# Patient Record
Sex: FEMALE | Race: WHITE | Employment: OTHER | URBAN - METROPOLITAN AREA
[De-identification: names, ages, dates, MRNs, and addresses within clinical notes are randomized per-mention and may not be internally consistent; named-entity substitution may affect disease eponyms.]

---

## 2017-01-05 ENCOUNTER — HOSPITAL ENCOUNTER (OUTPATIENT)
Dept: MAMMOGRAPHY | Age: 52
Discharge: HOME | End: 2017-01-05
Attending: SURGERY | Admitting: SURGERY

## 2017-01-05 DIAGNOSIS — Z12.31 VISIT FOR SCREENING MAMMOGRAM: ICD-10-CM

## 2017-02-28 ENCOUNTER — OFFICE VISIT (OUTPATIENT)
Dept: FAMILY MEDICINE CLINIC | Age: 52
End: 2017-02-28

## 2017-02-28 VITALS
OXYGEN SATURATION: 95 % | WEIGHT: 199 LBS | SYSTOLIC BLOOD PRESSURE: 121 MMHG | HEART RATE: 95 BPM | TEMPERATURE: 96.7 F | BODY MASS INDEX: 34.16 KG/M2 | DIASTOLIC BLOOD PRESSURE: 72 MMHG

## 2017-02-28 DIAGNOSIS — F41.9 ANXIETY: ICD-10-CM

## 2017-02-28 DIAGNOSIS — D47.3 ESSENTIAL THROMBOCYTOSIS (HCC): Primary | ICD-10-CM

## 2017-02-28 DIAGNOSIS — M25.50 ARTHRALGIA OF MULTIPLE JOINTS: ICD-10-CM

## 2017-02-28 DIAGNOSIS — E66.9 NON MORBID OBESITY, UNSPECIFIED OBESITY TYPE: ICD-10-CM

## 2017-02-28 DIAGNOSIS — L88 PYODERMA GANGRENOSA: ICD-10-CM

## 2017-02-28 PROCEDURE — 99214 OFFICE O/P EST MOD 30 MIN: CPT | Performed by: FAMILY MEDICINE

## 2017-02-28 RX ORDER — MELOXICAM 15 MG/1
15 TABLET ORAL DAILY
Qty: 30 TABLET | Refills: 5 | Status: SHIPPED | OUTPATIENT
Start: 2017-02-28 | End: 2017-08-24 | Stop reason: SDUPTHER

## 2017-02-28 RX ORDER — ALPRAZOLAM 1 MG/1
TABLET ORAL
Qty: 30 TABLET | Refills: 2 | Status: SHIPPED | OUTPATIENT
Start: 2017-02-28 | End: 2017-05-30 | Stop reason: SDUPTHER

## 2017-05-17 RX ORDER — HYDROXYUREA 500 MG/1
CAPSULE ORAL
Qty: 30 CAPSULE | Refills: 5 | Status: SHIPPED | OUTPATIENT
Start: 2017-05-17 | End: 2017-10-25 | Stop reason: SDUPTHER

## 2017-05-30 ENCOUNTER — OFFICE VISIT (OUTPATIENT)
Dept: FAMILY MEDICINE CLINIC | Age: 52
End: 2017-05-30

## 2017-05-30 VITALS
BODY MASS INDEX: 34.42 KG/M2 | SYSTOLIC BLOOD PRESSURE: 123 MMHG | WEIGHT: 200.5 LBS | DIASTOLIC BLOOD PRESSURE: 54 MMHG | RESPIRATION RATE: 14 BRPM | TEMPERATURE: 97 F | HEART RATE: 82 BPM

## 2017-05-30 DIAGNOSIS — K21.9 GASTROESOPHAGEAL REFLUX DISEASE WITHOUT ESOPHAGITIS: ICD-10-CM

## 2017-05-30 DIAGNOSIS — E66.9 OBESITY (BMI 30-39.9): ICD-10-CM

## 2017-05-30 DIAGNOSIS — R63.5 WEIGHT GAIN: Primary | ICD-10-CM

## 2017-05-30 DIAGNOSIS — F41.9 ANXIETY: ICD-10-CM

## 2017-05-30 DIAGNOSIS — D45 POLYCYTHEMIA VERA (HCC): ICD-10-CM

## 2017-05-30 PROCEDURE — 99214 OFFICE O/P EST MOD 30 MIN: CPT | Performed by: FAMILY MEDICINE

## 2017-05-30 RX ORDER — ALPRAZOLAM 1 MG/1
TABLET ORAL
Qty: 30 TABLET | Refills: 2 | Status: SHIPPED | OUTPATIENT
Start: 2017-05-30 | End: 2017-08-14 | Stop reason: SDUPTHER

## 2017-05-30 RX ORDER — OMEPRAZOLE 20 MG/1
CAPSULE, DELAYED RELEASE ORAL
Qty: 30 CAPSULE | Refills: 5 | Status: SHIPPED | OUTPATIENT
Start: 2017-05-30 | End: 2017-10-25 | Stop reason: SDUPTHER

## 2017-08-14 ENCOUNTER — OFFICE VISIT (OUTPATIENT)
Dept: FAMILY MEDICINE CLINIC | Age: 52
End: 2017-08-14

## 2017-08-14 VITALS
RESPIRATION RATE: 12 BRPM | WEIGHT: 177.4 LBS | BODY MASS INDEX: 30.45 KG/M2 | DIASTOLIC BLOOD PRESSURE: 63 MMHG | HEART RATE: 83 BPM | TEMPERATURE: 96.5 F | OXYGEN SATURATION: 95 % | SYSTOLIC BLOOD PRESSURE: 116 MMHG

## 2017-08-14 DIAGNOSIS — F41.9 ANXIETY: ICD-10-CM

## 2017-08-14 DIAGNOSIS — D45 POLYCYTHEMIA VERA (HCC): Primary | ICD-10-CM

## 2017-08-14 DIAGNOSIS — R06.83 SNORING: ICD-10-CM

## 2017-08-14 DIAGNOSIS — E66.9 NON MORBID OBESITY, UNSPECIFIED OBESITY TYPE: ICD-10-CM

## 2017-08-14 PROCEDURE — 99214 OFFICE O/P EST MOD 30 MIN: CPT | Performed by: FAMILY MEDICINE

## 2017-08-14 RX ORDER — ALPRAZOLAM 1 MG/1
TABLET ORAL
Qty: 30 TABLET | Refills: 2 | Status: SHIPPED | OUTPATIENT
Start: 2017-08-14 | End: 2017-10-25 | Stop reason: SDUPTHER

## 2017-10-25 ENCOUNTER — HOSPITAL ENCOUNTER (OUTPATIENT)
Dept: OTHER | Age: 52
Discharge: HOME | End: 2017-10-25
Attending: FAMILY MEDICINE | Admitting: FAMILY MEDICINE

## 2017-10-25 ENCOUNTER — OFFICE VISIT (OUTPATIENT)
Dept: FAMILY MEDICINE CLINIC | Age: 52
End: 2017-10-25

## 2017-10-25 VITALS
HEART RATE: 79 BPM | DIASTOLIC BLOOD PRESSURE: 63 MMHG | RESPIRATION RATE: 16 BRPM | WEIGHT: 169 LBS | OXYGEN SATURATION: 98 % | BODY MASS INDEX: 29.01 KG/M2 | SYSTOLIC BLOOD PRESSURE: 130 MMHG

## 2017-10-25 DIAGNOSIS — D47.3 ESSENTIAL THROMBOCYTOSIS (HCC): ICD-10-CM

## 2017-10-25 DIAGNOSIS — D45 POLYCYTHEMIA VERA (HCC): ICD-10-CM

## 2017-10-25 DIAGNOSIS — Z00.00 ROUTINE GENERAL MEDICAL EXAMINATION AT A HEALTH CARE FACILITY: ICD-10-CM

## 2017-10-25 DIAGNOSIS — Z23 NEEDS FLU SHOT: ICD-10-CM

## 2017-10-25 DIAGNOSIS — D47.3 ESSENTIAL THROMBOCYTOSIS (HCC): Primary | ICD-10-CM

## 2017-10-25 DIAGNOSIS — M79.642 BILATERAL HAND PAIN: ICD-10-CM

## 2017-10-25 DIAGNOSIS — M79.641 BILATERAL HAND PAIN: ICD-10-CM

## 2017-10-25 DIAGNOSIS — F41.9 ANXIETY: ICD-10-CM

## 2017-10-25 DIAGNOSIS — M25.50 ARTHRALGIA OF MULTIPLE JOINTS: ICD-10-CM

## 2017-10-25 DIAGNOSIS — K21.9 GASTROESOPHAGEAL REFLUX DISEASE WITHOUT ESOPHAGITIS: ICD-10-CM

## 2017-10-25 LAB
A/G RATIO: 1.8 (ref 1.1–2.2)
ALBUMIN SERPL-MCNC: 4.6 G/DL (ref 3.4–5)
ALP BLD-CCNC: 68 U/L (ref 40–129)
ALT SERPL-CCNC: 52 U/L (ref 10–40)
ANION GAP SERPL CALCULATED.3IONS-SCNC: 16 MMOL/L (ref 3–16)
AST SERPL-CCNC: 32 U/L (ref 15–37)
BILIRUB SERPL-MCNC: 0.5 MG/DL (ref 0–1)
BUN BLDV-MCNC: 16 MG/DL (ref 7–20)
C-REACTIVE PROTEIN: 3.5 MG/L (ref 0–5.1)
CALCIUM SERPL-MCNC: 10 MG/DL (ref 8.3–10.6)
CHLORIDE BLD-SCNC: 100 MMOL/L (ref 99–110)
CHOLESTEROL, TOTAL: 247 MG/DL (ref 0–199)
CO2: 26 MMOL/L (ref 21–32)
CREAT SERPL-MCNC: 0.6 MG/DL (ref 0.6–1.1)
GFR AFRICAN AMERICAN: >60
GFR NON-AFRICAN AMERICAN: >60
GLOBULIN: 2.5 G/DL
GLUCOSE BLD-MCNC: 97 MG/DL (ref 70–99)
HCT VFR BLD CALC: 46.2 % (ref 36–48)
HDLC SERPL-MCNC: 74 MG/DL (ref 40–60)
HEMOGLOBIN: 15.4 G/DL (ref 12–16)
LDL CHOLESTEROL CALCULATED: 147 MG/DL
MCH RBC QN AUTO: 36 PG (ref 26–34)
MCHC RBC AUTO-ENTMCNC: 33.2 G/DL (ref 31–36)
MCV RBC AUTO: 108.4 FL (ref 80–100)
PDW BLD-RTO: 14 % (ref 12.4–15.4)
PLATELET # BLD: 376 K/UL (ref 135–450)
PMV BLD AUTO: 8.3 FL (ref 5–10.5)
POTASSIUM SERPL-SCNC: 5.7 MMOL/L (ref 3.5–5.1)
RBC # BLD: 4.26 M/UL (ref 4–5.2)
RHEUMATOID FACTOR: <10 IU/ML
SEDIMENTATION RATE, ERYTHROCYTE: 16 MM/HR (ref 0–30)
SODIUM BLD-SCNC: 142 MMOL/L (ref 136–145)
TOTAL PROTEIN: 7.1 G/DL (ref 6.4–8.2)
TRIGL SERPL-MCNC: 129 MG/DL (ref 0–150)
TSH SERPL DL<=0.05 MIU/L-ACNC: 1.26 UIU/ML (ref 0.27–4.2)
VLDLC SERPL CALC-MCNC: 26 MG/DL
WBC # BLD: 5.2 K/UL (ref 4–11)

## 2017-10-25 PROCEDURE — 99214 OFFICE O/P EST MOD 30 MIN: CPT | Performed by: FAMILY MEDICINE

## 2017-10-25 PROCEDURE — 3014F SCREEN MAMMO DOC REV: CPT | Performed by: FAMILY MEDICINE

## 2017-10-25 PROCEDURE — 3017F COLORECTAL CA SCREEN DOC REV: CPT | Performed by: FAMILY MEDICINE

## 2017-10-25 PROCEDURE — G8484 FLU IMMUNIZE NO ADMIN: HCPCS | Performed by: FAMILY MEDICINE

## 2017-10-25 PROCEDURE — 1036F TOBACCO NON-USER: CPT | Performed by: FAMILY MEDICINE

## 2017-10-25 PROCEDURE — 90686 IIV4 VACC NO PRSV 0.5 ML IM: CPT | Performed by: FAMILY MEDICINE

## 2017-10-25 PROCEDURE — G8427 DOCREV CUR MEDS BY ELIG CLIN: HCPCS | Performed by: FAMILY MEDICINE

## 2017-10-25 PROCEDURE — 90471 IMMUNIZATION ADMIN: CPT | Performed by: FAMILY MEDICINE

## 2017-10-25 PROCEDURE — G8417 CALC BMI ABV UP PARAM F/U: HCPCS | Performed by: FAMILY MEDICINE

## 2017-10-25 RX ORDER — ALPRAZOLAM 1 MG/1
TABLET ORAL
Qty: 30 TABLET | Refills: 2 | Status: SHIPPED | OUTPATIENT
Start: 2017-10-25 | End: 2018-01-23 | Stop reason: SDUPTHER

## 2017-10-25 RX ORDER — HYDROXYUREA 500 MG/1
CAPSULE ORAL
Qty: 30 CAPSULE | Refills: 5 | Status: SHIPPED | OUTPATIENT
Start: 2017-10-25 | End: 2018-05-07 | Stop reason: SDUPTHER

## 2017-10-25 RX ORDER — OMEPRAZOLE 20 MG/1
CAPSULE, DELAYED RELEASE ORAL
Qty: 30 CAPSULE | Refills: 5 | Status: SHIPPED | OUTPATIENT
Start: 2017-10-25 | End: 2018-05-07 | Stop reason: SDUPTHER

## 2017-10-25 RX ORDER — CELECOXIB 200 MG/1
200 CAPSULE ORAL DAILY
Qty: 30 CAPSULE | Refills: 5 | Status: SHIPPED | OUTPATIENT
Start: 2017-10-25 | End: 2018-04-18 | Stop reason: SDUPTHER

## 2017-10-25 NOTE — PROGRESS NOTES
Vaccine Information Sheet, \"Influenza - Inactivated\"  given to Lyndsay Rahman, or parent/legal guardian of  Lyndsay Rahman and verbalized understanding. Patient responses:    Have you ever had a reaction to a flu vaccine? Yes- Migraine years ago  Are you able to eat eggs without adverse effects? Yes  Do you have any current illness? No  Have you ever had Guillian Cordova Syndrome? No    Flu vaccine given per order. Please see immunization tab. Current Influenza vaccine VIS given to patient. Influenza consent form/questionnaire completed and signed. Patient responses to  Influenza consent form / questionnaire  reviewed. Vaccine given per protocol.

## 2017-10-25 NOTE — PROGRESS NOTES
Here for eval of some pain in hands. Pt started with sx acutely a few days ago, does not remember any trigger for sx. Sx started in middle finger L > R but now seems to be the entire hand. Middle is worse. Sx are in fingers and thumbs. Pt has not had similar sx to this degree. Pt states that she tried to treat with NSAIDs and tylenol with some mild improvement but not completely improved. Pt does have hx of osteoarthritis and does take mobic. Pts grandmother did have Rheumatoid Arthritis. Pt has had a history of osteoarthritis to hands, but not to this degress. Pt has not been on steroids recently but states that when she was, had mood changes. Pt is concerned about severity of sx. Pt did have xray of finger in the past but not of hands. Pt does not remember any change in diet, no change in activity level that would account for her symptoms. No significant sx in knees, ankles, hips, feet, etc.     Pt states that otherwise has been doing well, denies any new concerns or issues. States mood doing well, stable. deneis any breakthru issues of anxiety, no panic sx. No issues of chest pain, shortness of breath. No fever, chills      Except as noted above in the history of present illness, the review of systems is  negative for headache, vision changes, chest pain, shortness of breath, abdominal pain, urinary sx, bowel changes. Past medical, surgical, and social history reviewed and updated  Medications and allergies reviewed and updated       O: /63   Pulse 79   Resp 16   Wt 169 lb (76.7 kg)   LMP 10/21/2017   SpO2 98%   Breastfeeding? No   BMI 29.01 kg/m²   GEN: No acute distress, cooperative, well nourished, alert. HEENT: PEERLA, EOMI , normocephalic/atraumatic, nares and oropharynx clear. Mucus membranes normal, Tympanic membranes clear bilaterally. Neck: soft, supple, no thyromegaly, mass, no Lymphadenopathy  CV: Regular rate and rhythm, no murmur, rubs, gallops.  No edema.  Resp: Clear to auscultation bilaterally good air entry bilaterally  No crackles, wheeze. Breathing comfortably. Musc: full range of motion bilateral upper extremities. Mild pain to hands bilaterally over PIP and DIP joints R > L without visible synovitis or swelling. Psych: mood stable, No suicidal thoughts or ideation      Current Outpatient Prescriptions   Medication Sig Dispense Refill    ALPRAZolam (XANAX) 1 MG tablet TAKE 1 TABLET BY MOUTH THREE TIMES DAILY AS NEEDED FOR ANXIETY 30 tablet 2    omeprazole (PRILOSEC) 20 MG delayed release capsule TAKE 1 CAPSULE BY MOUTH DAILY 30 capsule 5    hydroxyurea (HYDREA) 500 MG chemo capsule TAKE ONE CAPSULE BY MOUTH DAILY OR AS DIRECTED BASED ON BLOOD WORK 30 capsule 5    celecoxib (CELEBREX) 200 MG capsule Take 1 capsule by mouth daily 30 capsule 5    PROAIR  (90 BASE) MCG/ACT inhaler INHALE TWO PUFFS BY MOUTH EVERY FOUR HOURS AS NEEDED FOR WHEEZING OR SHORTNESS OF BREATH 8.5 g 5    Ascorbic Acid (VITAMIN C) 1000 MG tablet Take 1,000 mg by mouth daily.  vitamin B-12 (CYANOCOBALAMIN) 100 MCG tablet Take 100 mcg by mouth daily.  Pyridoxine HCl (VITAMIN B-6) 50 MG tablet Take 100 mg by mouth daily.  ferrous sulfate 325 (65 FE) MG tablet Take 325 mg by mouth daily (with breakfast). No current facility-administered medications for this visit. ASSESSMENT / PLAN:    1. Anxiety  Stable w/o flare of sx  Cont prn xanax  refills given as below  - ALPRAZolam (XANAX) 1 MG tablet; TAKE 1 TABLET BY MOUTH THREE TIMES DAILY AS NEEDED FOR ANXIETY  Dispense: 30 tablet; Refill: 2  Controlled Substances Monitoring:     Attestation: The Prescription Monitoring Report for this patient was reviewed today. Asif Ramirez MD)  Documentation: Possible medication side effects, risk of tolerance and/or dependence, and alternative treatments discussed., No signs of potential drug abuse or diversion identified. Asif Ramirez MD)     2. Gastroesophageal reflux disease without esophagitis  Stable with PPI therapy  Check cmp  - omeprazole (PRILOSEC) 20 MG delayed release capsule; TAKE 1 CAPSULE BY MOUTH DAILY  Dispense: 30 capsule; Refill: 5    3. Essential thrombocytosis (HCC)  Stable with hydrea  Cont to monitor closely, f/u with heme/onc  Check fasting bloodwork as below:  - CBC; Future  - Comprehensive Metabolic Panel; Future    4. Polycythemia vera ([facility] [address]  - CBC; Future    5. Needs flu shot  Given today    6. Routine general medical examination at a health care facility  Check fasting bloodwork for:  - Lipid Panel; Future  - TSH without Reflex; Future    7. Arthralgia of multiple joints  Exam nonfocal but with moderate irritation/discomfort  Check bloodwork to r/o underlying RA or connective tissue disease  Change meds from mobic to celebrex 200mg qd  Discussed use, benefit, and side effects of prescribed medications. Barriers to medication compliance addressed. All patient questions answered. Pt voiced understanding. Check xrays hands bilaterally  - NAVI; Future  - Rheumatoid Factor; Future  - Sedimentation Rate; Future  - C-Reactive Protein; Future    8. Bilateral hand pain  As above, check xrays and bloodwork  Management pending results. Follow-up appointment:   Pending bloodwork results/xray results/3 mos/prn    Discussed use, benefit, and side effects of all prescribed medications. Barriers to medication compliance addressed. All patient questions answered. Pt voiced understanding. When applicable, patient's outside records were reviewed through [facility]. The patient has signed appropriate paperworks/consents. Dragon dictation software was used for parts of this progress note. All attempts were made to correct any errors and ensure accuracy.

## 2017-10-26 LAB
ANA INTERPRETATION: ABNORMAL
ANA PATTERN: ABNORMAL
ANA TITER: ABNORMAL
ANTI-NUCLEAR ANTIBODY (ANA): POSITIVE
PATHOLOGIST: ABNORMAL

## 2018-01-11 ENCOUNTER — HOSPITAL ENCOUNTER (OUTPATIENT)
Dept: MAMMOGRAPHY | Age: 53
Discharge: HOME | End: 2018-01-11
Attending: SURGERY | Admitting: SURGERY

## 2018-01-11 DIAGNOSIS — Z12.31 VISIT FOR SCREENING MAMMOGRAM: ICD-10-CM

## 2018-01-23 ENCOUNTER — OFFICE VISIT (OUTPATIENT)
Dept: FAMILY MEDICINE CLINIC | Age: 53
End: 2018-01-23

## 2018-01-23 VITALS
DIASTOLIC BLOOD PRESSURE: 75 MMHG | WEIGHT: 172 LBS | SYSTOLIC BLOOD PRESSURE: 132 MMHG | BODY MASS INDEX: 29.37 KG/M2 | RESPIRATION RATE: 14 BRPM | TEMPERATURE: 97.9 F | HEIGHT: 64 IN | OXYGEN SATURATION: 97 % | HEART RATE: 71 BPM

## 2018-01-23 DIAGNOSIS — R00.2 PALPITATIONS: ICD-10-CM

## 2018-01-23 DIAGNOSIS — D49.2 NEOPLASM OF SKIN OF FOREHEAD: ICD-10-CM

## 2018-01-23 DIAGNOSIS — D75.839 THROMBOCYTOSIS: Primary | ICD-10-CM

## 2018-01-23 DIAGNOSIS — F41.9 ANXIETY: ICD-10-CM

## 2018-01-23 PROCEDURE — 1036F TOBACCO NON-USER: CPT | Performed by: FAMILY MEDICINE

## 2018-01-23 PROCEDURE — G8417 CALC BMI ABV UP PARAM F/U: HCPCS | Performed by: FAMILY MEDICINE

## 2018-01-23 PROCEDURE — 3017F COLORECTAL CA SCREEN DOC REV: CPT | Performed by: FAMILY MEDICINE

## 2018-01-23 PROCEDURE — G8484 FLU IMMUNIZE NO ADMIN: HCPCS | Performed by: FAMILY MEDICINE

## 2018-01-23 PROCEDURE — 17000 DESTRUCT PREMALG LESION: CPT | Performed by: FAMILY MEDICINE

## 2018-01-23 PROCEDURE — 3014F SCREEN MAMMO DOC REV: CPT | Performed by: FAMILY MEDICINE

## 2018-01-23 PROCEDURE — G8427 DOCREV CUR MEDS BY ELIG CLIN: HCPCS | Performed by: FAMILY MEDICINE

## 2018-01-23 PROCEDURE — 99214 OFFICE O/P EST MOD 30 MIN: CPT | Performed by: FAMILY MEDICINE

## 2018-01-23 RX ORDER — ALPRAZOLAM 1 MG/1
TABLET ORAL
Qty: 30 TABLET | Refills: 2 | Status: SHIPPED | OUTPATIENT
Start: 2018-01-23 | End: 2018-04-09 | Stop reason: SDUPTHER

## 2018-01-23 NOTE — PROGRESS NOTES
Here for f/u, pt states that overall has been doing well, here for f/u of meds. Pt states that she is feeling well. Pt is working on improvement in exercise, did struggle a bit over the holidays. Pt has cut down on EtOH and is increasing exercise. Pt doing well with exercising daily, does feel well with exercise. Pt does have plans to do the 5K flying pig in a few months. Pt feels diet is doing well, although did struggle a bit over the holidays. Weight is stable. Pt denies any new of chest pain, shortness of breath. Pt will get some very intermittent flutter. Pt gets sx a few times a week, and only when mary grace to bed. Pt does not feel a flutter but some abnromal beat. Never with sx during exercise, only when laying down. No dizziness, lightheadedness. Can get some mild pressure in head. When she coughs or drinks water, sx go away. No syncope. Pt wit lesion on L forehead, present for a few months to 1 year. Does have some bleeding. Not healing well and wants to have evaulated. Pt is not treating at this time. Except as noted above in the history of present illness, the review of systems is  negative for headache, vision changes, chest pain, shortness of breath, abdominal pain, urinary sx, bowel changes. Past medical, surgical, and social history reviewed and updated  Medications and allergies reviewed and updated         O: /75   Pulse 71   Temp 97.9 °F (36.6 °C) (Oral)   Resp 14   Ht 5' 4\" (1.626 m)   Wt 172 lb (78 kg)   LMP 01/22/2018 (Exact Date)   SpO2 97%   Breastfeeding? No   BMI 29.52 kg/m²   GEN: No acute distress, cooperative, well nourished, alert. HEENT: PEERLA, EOMI , normocephalic/atraumatic, nares and oropharynx clear. Mucus membranes normal, Tympanic membranes clear bilaterally. Neck: soft, supple, no thyromegaly, mass, no Lymphadenopathy  CV: Regular rate and rhythm, no murmur, rubs, gallops. No edema.   Resp: Clear to auscultation bilaterally

## 2018-01-29 ENCOUNTER — OFFICE VISIT (OUTPATIENT)
Dept: FAMILY MEDICINE CLINIC | Age: 53
End: 2018-01-29

## 2018-01-29 VITALS
RESPIRATION RATE: 16 BRPM | HEART RATE: 78 BPM | SYSTOLIC BLOOD PRESSURE: 134 MMHG | OXYGEN SATURATION: 98 % | DIASTOLIC BLOOD PRESSURE: 73 MMHG

## 2018-01-29 DIAGNOSIS — J45.21 MILD INTERMITTENT REACTIVE AIRWAY DISEASE WITH ACUTE EXACERBATION: ICD-10-CM

## 2018-01-29 DIAGNOSIS — R05.9 COUGH: ICD-10-CM

## 2018-01-29 DIAGNOSIS — L98.9 LESION OF SKIN OF FACE: ICD-10-CM

## 2018-01-29 DIAGNOSIS — R09.89 CHEST CONGESTION: ICD-10-CM

## 2018-01-29 DIAGNOSIS — J06.9 ACUTE URI: Primary | ICD-10-CM

## 2018-01-29 PROBLEM — J45.909 REACTIVE AIRWAY DISEASE: Status: ACTIVE | Noted: 2018-01-29

## 2018-01-29 PROCEDURE — 3017F COLORECTAL CA SCREEN DOC REV: CPT | Performed by: FAMILY MEDICINE

## 2018-01-29 PROCEDURE — G8427 DOCREV CUR MEDS BY ELIG CLIN: HCPCS | Performed by: FAMILY MEDICINE

## 2018-01-29 PROCEDURE — 99214 OFFICE O/P EST MOD 30 MIN: CPT | Performed by: FAMILY MEDICINE

## 2018-01-29 PROCEDURE — 3014F SCREEN MAMMO DOC REV: CPT | Performed by: FAMILY MEDICINE

## 2018-01-29 PROCEDURE — G8417 CALC BMI ABV UP PARAM F/U: HCPCS | Performed by: FAMILY MEDICINE

## 2018-01-29 PROCEDURE — 1036F TOBACCO NON-USER: CPT | Performed by: FAMILY MEDICINE

## 2018-01-29 PROCEDURE — G8484 FLU IMMUNIZE NO ADMIN: HCPCS | Performed by: FAMILY MEDICINE

## 2018-01-29 RX ORDER — PROMETHAZINE HYDROCHLORIDE AND CODEINE PHOSPHATE 6.25; 1 MG/5ML; MG/5ML
5 SYRUP ORAL 4 TIMES DAILY PRN
Qty: 150 ML | Refills: 0 | Status: SHIPPED | OUTPATIENT
Start: 2018-01-29 | End: 2018-02-05

## 2018-01-29 RX ORDER — AZITHROMYCIN 250 MG/1
TABLET, FILM COATED ORAL
Qty: 1 PACKET | Refills: 0 | Status: SHIPPED | OUTPATIENT
Start: 2018-01-29 | End: 2018-02-08

## 2018-03-26 ENCOUNTER — OFFICE VISIT (OUTPATIENT)
Dept: PSYCHOLOGY | Age: 53
End: 2018-03-26

## 2018-03-26 DIAGNOSIS — F43.23 ADJUSTMENT DISORDER WITH MIXED ANXIETY AND DEPRESSED MOOD: Primary | ICD-10-CM

## 2018-03-26 PROCEDURE — 90791 PSYCH DIAGNOSTIC EVALUATION: CPT | Performed by: PSYCHOLOGIST

## 2018-03-26 ASSESSMENT — PATIENT HEALTH QUESTIONNAIRE - PHQ9
7. TROUBLE CONCENTRATING ON THINGS, SUCH AS READING THE NEWSPAPER OR WATCHING TELEVISION: 2
3. TROUBLE FALLING OR STAYING ASLEEP: 3
6. FEELING BAD ABOUT YOURSELF - OR THAT YOU ARE A FAILURE OR HAVE LET YOURSELF OR YOUR FAMILY DOWN: 1
9. THOUGHTS THAT YOU WOULD BE BETTER OFF DEAD, OR OF HURTING YOURSELF: 0
5. POOR APPETITE OR OVEREATING: 2
SUM OF ALL RESPONSES TO PHQ QUESTIONS 1-9: 13
1. LITTLE INTEREST OR PLEASURE IN DOING THINGS: 0
8. MOVING OR SPEAKING SO SLOWLY THAT OTHER PEOPLE COULD HAVE NOTICED. OR THE OPPOSITE, BEING SO FIGETY OR RESTLESS THAT YOU HAVE BEEN MOVING AROUND A LOT MORE THAN USUAL: 2
2. FEELING DOWN, DEPRESSED OR HOPELESS: 1
SUM OF ALL RESPONSES TO PHQ9 QUESTIONS 1 & 2: 1
4. FEELING TIRED OR HAVING LITTLE ENERGY: 2

## 2018-03-26 ASSESSMENT — ANXIETY QUESTIONNAIRES
GAD7 TOTAL SCORE: 17
3. WORRYING TOO MUCH ABOUT DIFFERENT THINGS: 3-NEARLY EVERY DAY
4. TROUBLE RELAXING: 3-NEARLY EVERY DAY
5. BEING SO RESTLESS THAT IT IS HARD TO SIT STILL: 3-NEARLY EVERY DAY
2. NOT BEING ABLE TO STOP OR CONTROL WORRYING: 3-NEARLY EVERY DAY
7. FEELING AFRAID AS IF SOMETHING AWFUL MIGHT HAPPEN: 1-SEVERAL DAYS
6. BECOMING EASILY ANNOYED OR IRRITABLE: 1-SEVERAL DAYS
1. FEELING NERVOUS, ANXIOUS, OR ON EDGE: 3-NEARLY EVERY DAY

## 2018-03-26 NOTE — PROGRESS NOTES
place, time, general circumstance  Memory: recent and remote memory intact  Attention/Concentration: intact during session  Psychomotor Activity: normal  Eye Contact: normal  Speech: normal rate and volume, well-articulated  Mood: anxious  Affect: congruent with thought content and mood  Perception: within normal limits  Thought Content: within normal limits   Thought Process: logical, goal-directed, coherent  Insight: fair  Judgment: intact  Morbid ideation: no  Suicide Assessment: no suicidal ideation      History:    Medications:   Current Outpatient Prescriptions   Medication Sig Dispense Refill    omeprazole (PRILOSEC) 20 MG delayed release capsule TAKE 1 CAPSULE BY MOUTH DAILY 30 capsule 5    hydroxyurea (HYDREA) 500 MG chemo capsule TAKE ONE CAPSULE BY MOUTH DAILY OR AS DIRECTED BASED ON BLOOD WORK 30 capsule 5    celecoxib (CELEBREX) 200 MG capsule Take 1 capsule by mouth daily 30 capsule 5    PROAIR  (90 BASE) MCG/ACT inhaler INHALE TWO PUFFS BY MOUTH EVERY FOUR HOURS AS NEEDED FOR WHEEZING OR SHORTNESS OF BREATH 8.5 g 5    Ascorbic Acid (VITAMIN C) 1000 MG tablet Take 1,000 mg by mouth daily.  vitamin B-12 (CYANOCOBALAMIN) 100 MCG tablet Take 100 mcg by mouth daily.  Pyridoxine HCl (VITAMIN B-6) 50 MG tablet Take 100 mg by mouth daily.  ferrous sulfate 325 (65 FE) MG tablet Take 325 mg by mouth daily (with breakfast). No current facility-administered medications for this visit. Social History:   Social History     Social History    Marital status:      Spouse name: N/A    Number of children: N/A    Years of education: N/A     Occupational History    Not on file.      Social History Main Topics    Smoking status: Former Smoker     Quit date: 2/11/1998    Smokeless tobacco: Never Used    Alcohol use 0.5 oz/week     1 Standard drinks or equivalent per week    Drug use: No    Sexual activity: Yes     Partners: Male     Other Topics Concern    Not on file

## 2018-04-09 ENCOUNTER — OFFICE VISIT (OUTPATIENT)
Dept: FAMILY MEDICINE CLINIC | Age: 53
End: 2018-04-09

## 2018-04-09 VITALS
HEIGHT: 64 IN | TEMPERATURE: 96 F | DIASTOLIC BLOOD PRESSURE: 68 MMHG | OXYGEN SATURATION: 97 % | HEART RATE: 79 BPM | BODY MASS INDEX: 29.33 KG/M2 | WEIGHT: 171.8 LBS | RESPIRATION RATE: 16 BRPM | SYSTOLIC BLOOD PRESSURE: 124 MMHG

## 2018-04-09 DIAGNOSIS — F41.9 ANXIETY: Primary | ICD-10-CM

## 2018-04-09 DIAGNOSIS — F51.01 PRIMARY INSOMNIA: ICD-10-CM

## 2018-04-09 PROCEDURE — 3014F SCREEN MAMMO DOC REV: CPT | Performed by: FAMILY MEDICINE

## 2018-04-09 PROCEDURE — 3017F COLORECTAL CA SCREEN DOC REV: CPT | Performed by: FAMILY MEDICINE

## 2018-04-09 PROCEDURE — G8417 CALC BMI ABV UP PARAM F/U: HCPCS | Performed by: FAMILY MEDICINE

## 2018-04-09 PROCEDURE — 99214 OFFICE O/P EST MOD 30 MIN: CPT | Performed by: FAMILY MEDICINE

## 2018-04-09 PROCEDURE — G8427 DOCREV CUR MEDS BY ELIG CLIN: HCPCS | Performed by: FAMILY MEDICINE

## 2018-04-09 PROCEDURE — 1036F TOBACCO NON-USER: CPT | Performed by: FAMILY MEDICINE

## 2018-04-09 RX ORDER — ALPRAZOLAM 1 MG/1
TABLET ORAL
COMMUNITY
Start: 2016-04-07 | End: 2018-04-09

## 2018-04-09 RX ORDER — ALPRAZOLAM 1 MG/1
TABLET ORAL
Qty: 30 TABLET | Refills: 2 | Status: SHIPPED | OUTPATIENT
Start: 2018-04-09 | End: 2018-07-09 | Stop reason: SDUPTHER

## 2018-04-09 RX ORDER — ALPRAZOLAM 1 MG/1
TABLET ORAL
Status: CANCELLED | OUTPATIENT
Start: 2018-04-09

## 2018-04-18 RX ORDER — CELECOXIB 200 MG/1
200 CAPSULE ORAL DAILY
Qty: 30 CAPSULE | Refills: 5 | Status: SHIPPED | OUTPATIENT
Start: 2018-04-18 | End: 2019-01-29 | Stop reason: SDUPTHER

## 2018-07-09 ENCOUNTER — OFFICE VISIT (OUTPATIENT)
Dept: FAMILY MEDICINE CLINIC | Age: 53
End: 2018-07-09

## 2018-07-09 VITALS
TEMPERATURE: 97.3 F | RESPIRATION RATE: 12 BRPM | DIASTOLIC BLOOD PRESSURE: 64 MMHG | SYSTOLIC BLOOD PRESSURE: 125 MMHG | HEART RATE: 88 BPM | OXYGEN SATURATION: 94 % | BODY MASS INDEX: 28.12 KG/M2 | WEIGHT: 163.8 LBS

## 2018-07-09 DIAGNOSIS — F43.9 STRESS: ICD-10-CM

## 2018-07-09 DIAGNOSIS — N95.1 PERIMENOPAUSAL: ICD-10-CM

## 2018-07-09 DIAGNOSIS — R23.2 HOT FLASHES: ICD-10-CM

## 2018-07-09 DIAGNOSIS — F51.01 PRIMARY INSOMNIA: ICD-10-CM

## 2018-07-09 DIAGNOSIS — D45 POLYCYTHEMIA VERA (HCC): ICD-10-CM

## 2018-07-09 DIAGNOSIS — L98.9 LESION OF SKIN OF FACE: ICD-10-CM

## 2018-07-09 DIAGNOSIS — F41.9 ANXIETY: Primary | ICD-10-CM

## 2018-07-09 PROCEDURE — 1036F TOBACCO NON-USER: CPT | Performed by: FAMILY MEDICINE

## 2018-07-09 PROCEDURE — 3017F COLORECTAL CA SCREEN DOC REV: CPT | Performed by: FAMILY MEDICINE

## 2018-07-09 PROCEDURE — G8427 DOCREV CUR MEDS BY ELIG CLIN: HCPCS | Performed by: FAMILY MEDICINE

## 2018-07-09 PROCEDURE — G8417 CALC BMI ABV UP PARAM F/U: HCPCS | Performed by: FAMILY MEDICINE

## 2018-07-09 PROCEDURE — 99214 OFFICE O/P EST MOD 30 MIN: CPT | Performed by: FAMILY MEDICINE

## 2018-07-09 RX ORDER — ALPRAZOLAM 1 MG/1
1 TABLET ORAL NIGHTLY PRN
COMMUNITY
End: 2018-09-25 | Stop reason: SDUPTHER

## 2018-07-09 RX ORDER — ALPRAZOLAM 1 MG/1
TABLET ORAL
Qty: 30 TABLET | Refills: 2 | Status: SHIPPED | OUTPATIENT
Start: 2018-07-09 | End: 2018-08-08

## 2018-07-24 ENCOUNTER — HOSPITAL ENCOUNTER (OUTPATIENT)
Dept: CT IMAGING | Age: 53
Discharge: HOME | End: 2018-07-24
Payer: COMMERCIAL

## 2018-07-24 DIAGNOSIS — R91.1 COIN LESION: ICD-10-CM

## 2018-07-24 PROCEDURE — 71260 CT THORAX DX C+: CPT

## 2018-07-24 PROCEDURE — 6360000004 HC RX CONTRAST MEDICATION: Performed by: INTERNAL MEDICINE

## 2018-07-24 RX ADMIN — IOPAMIDOL 80 ML: 755 INJECTION, SOLUTION INTRAVENOUS at 08:43

## 2018-07-25 ENCOUNTER — OFFICE VISIT (OUTPATIENT)
Dept: DERMATOLOGY | Age: 53
End: 2018-07-25

## 2018-07-25 DIAGNOSIS — D48.5 NEOPLASM OF UNCERTAIN BEHAVIOR OF SKIN: Primary | ICD-10-CM

## 2018-07-25 DIAGNOSIS — L57.0 AK (ACTINIC KERATOSIS): ICD-10-CM

## 2018-07-25 PROCEDURE — 11100 PR BIOPSY OF SKIN LESION: CPT | Performed by: DERMATOLOGY

## 2018-07-25 PROCEDURE — 17000 DESTRUCT PREMALG LESION: CPT | Performed by: DERMATOLOGY

## 2018-07-25 PROCEDURE — 11101 PR BIOPSY, EACH ADDED LESION: CPT | Performed by: DERMATOLOGY

## 2018-07-25 NOTE — PATIENT INSTRUCTIONS

## 2018-07-27 ENCOUNTER — TELEPHONE (OUTPATIENT)
Dept: DERMATOLOGY | Age: 53
End: 2018-07-27

## 2018-07-27 DIAGNOSIS — C44.319 BASAL CELL CARCINOMA OF FOREHEAD: Primary | ICD-10-CM

## 2018-07-27 NOTE — TELEPHONE ENCOUNTER
Reviewed results of the biopsy with the patient. Date of biopsy: 7/25/18  Site of biopsy: L side of forehead  Result: NBCC    Plan: MOHS with Dr. Louisa Rubin    Date of biopsy: 7/25/18  Site of biopsy: R side of forehead  Result: NBCC    Plan: MOHS with Dr. Louisa Rubin    The patient expressed understanding of the plan.

## 2018-08-09 ENCOUNTER — PROCEDURE VISIT (OUTPATIENT)
Dept: SURGERY | Age: 53
End: 2018-08-09

## 2018-08-09 VITALS
OXYGEN SATURATION: 96 % | WEIGHT: 164 LBS | SYSTOLIC BLOOD PRESSURE: 107 MMHG | DIASTOLIC BLOOD PRESSURE: 73 MMHG | BODY MASS INDEX: 28.15 KG/M2 | HEART RATE: 86 BPM | TEMPERATURE: 97.5 F

## 2018-08-09 DIAGNOSIS — C44.319 BASAL CELL CARCINOMA OF LEFT FOREHEAD: Primary | ICD-10-CM

## 2018-08-09 PROCEDURE — 17311 MOHS 1 STAGE H/N/HF/G: CPT | Performed by: DERMATOLOGY

## 2018-08-09 PROCEDURE — 13132 CMPLX RPR F/C/C/M/N/AX/G/H/F: CPT | Performed by: DERMATOLOGY

## 2018-08-09 PROCEDURE — 17312 MOHS ADDL STAGE: CPT | Performed by: DERMATOLOGY

## 2018-08-09 NOTE — PROGRESS NOTES
using the technique of Mohs. A map was prepared to correspond to the area of skin from which it was excised. Hemostasis was achieved using electrosurgery. The wound was bandaged. The tissue was prepared for the cryostat and sectioned. 1 section(s) prepared. Each section was coded, cut, and stained for microscopic examination. The entire base and margins of the excised piece of tissue were examined by the surgeon. Stage I:  Nodular BCC: large basaloid lobules of varying shape and size with peripheral palisading present around the rim of the lobule, with retraction of the tumor lobules from their associated stroma. The remaining tumor was noted and the next stage was performed. Stage II:  A thin layer of tissue was removed at the histologically-identified sites of remaining tumor. The entire procedure as described in stage I was repeated to process the tissue according to Mohs technique. 1 section(s) prepared for stage II. No tumor was identified at the peripheral margins of stage II of microscopically controlled surgery. DEFECT MANAGEMENT:    REPAIR DESCRIPTION:  Various closure modalities were discussed with the patient, and it was decided that a complex repair would best preserve normal anatomic and functional relationships. Additional risk of wound dehiscence was discussed. The patient was placed on the procedure room table. The area was anesthetized with 1% lidocaine with epinephrine 1:100,000 buffered, was given a sterile prep using Chlorhexidine gluconate 4% solution, and draped in the usual sterile fashion. Recreation and enlargement of the wound was performed by excising cones of tissue via the triangulation technique. The final incision lines were placed with respect for the patient's natural skin tension lines in a cresentic configuration to avoid functional and aesthetic distortion of adjacent free margins.  Following extensive undermining, meticulous hemostasis was obtained with spot monopolar electrocoagulation. Subcutaneous dead space and dermis were closed using 5-0 Vicryl buried subcutaneous interrupted suture and the epidermis was approximated with 6-0 Fast absorbing gut using running epidermal sutures. WOUND COVERAGE:  The wound was cleansed with normal saline and dried. Liquid skin adhesive was applied for wound protection and additional epidermal adhesion. The patient was given detailed oral and written information on post-operative care. There were no complications. The patient left the Unit in good medical condition. FOLLOW-UP:  As dissolving sutures were placed, the patient was asked to return if any questions or concerns arose, but otherwise will return to see general dermatology per their instructions.

## 2018-08-10 ENCOUNTER — TELEPHONE (OUTPATIENT)
Dept: SURGERY | Age: 53
End: 2018-08-10

## 2018-08-16 ENCOUNTER — PROCEDURE VISIT (OUTPATIENT)
Dept: SURGERY | Age: 53
End: 2018-08-16

## 2018-08-16 VITALS
SYSTOLIC BLOOD PRESSURE: 115 MMHG | OXYGEN SATURATION: 95 % | TEMPERATURE: 97.6 F | DIASTOLIC BLOOD PRESSURE: 76 MMHG | BODY MASS INDEX: 28.15 KG/M2 | WEIGHT: 164 LBS | HEART RATE: 82 BPM

## 2018-08-16 DIAGNOSIS — C44.319 BASAL CELL CARCINOMA OF RIGHT FOREHEAD: Primary | ICD-10-CM

## 2018-08-16 PROCEDURE — 12051 INTMD RPR FACE/MM 2.5 CM/<: CPT | Performed by: DERMATOLOGY

## 2018-08-16 PROCEDURE — 17311 MOHS 1 STAGE H/N/HF/G: CPT | Performed by: DERMATOLOGY

## 2018-08-16 PROCEDURE — 17312 MOHS ADDL STAGE: CPT | Performed by: DERMATOLOGY

## 2018-08-16 NOTE — PROGRESS NOTES
Subcutaneous dead space and dermis were closed using 5-0 Vicryl buried subcutaneous interrupted suture and the epidermis was approximated with liquid skin adhesive. WOUND COVERAGE:  The wound was cleaned with normal saline solution, dried off, Aquaphor ointment was applied, and the wound was covered. A dressing was applied for stabilization and light pressure. The patient was given detailed oral and written instructions on postoperative care. There were no complications. The patient left the Unit in good medical condition. FOLLOW-UP:  As liquid skin adhesive was placed for epidermal closure, the patient was asked to return if any questions or concerns arose, but otherwise will return to see general dermatology per their instructions.

## 2018-08-17 ENCOUNTER — TELEPHONE (OUTPATIENT)
Dept: SURGERY | Age: 53
End: 2018-08-17

## 2018-08-17 NOTE — TELEPHONE ENCOUNTER
The patient was in the office on 8/16/18 for Mohs surgery located on the Right side of forehead with intermediate layered closure repair. The patient tolerated the procedure well and left the office in good condition. A post-operative telephone call was placed at 11:59 in order to check on the patient's recovery process. The patient reported doing well and had no complaints. All of the patient's questions were answered.

## 2018-08-23 ENCOUNTER — OFFICE VISIT (OUTPATIENT)
Dept: DERMATOLOGY | Age: 53
End: 2018-08-23

## 2018-08-23 DIAGNOSIS — D23.9 DERMATOFIBROMA: Primary | ICD-10-CM

## 2018-08-23 DIAGNOSIS — L82.1 SK (SEBORRHEIC KERATOSIS): ICD-10-CM

## 2018-08-23 PROCEDURE — G8427 DOCREV CUR MEDS BY ELIG CLIN: HCPCS | Performed by: DERMATOLOGY

## 2018-08-23 PROCEDURE — 99212 OFFICE O/P EST SF 10 MIN: CPT | Performed by: DERMATOLOGY

## 2018-08-23 PROCEDURE — 3017F COLORECTAL CA SCREEN DOC REV: CPT | Performed by: DERMATOLOGY

## 2018-08-23 PROCEDURE — G8417 CALC BMI ABV UP PARAM F/U: HCPCS | Performed by: DERMATOLOGY

## 2018-08-23 PROCEDURE — 1036F TOBACCO NON-USER: CPT | Performed by: DERMATOLOGY

## 2018-08-23 NOTE — PROGRESS NOTES
TAKE 1 CAPSULE BY MOUTH DAILY 30 capsule 5    PROAIR  (90 BASE) MCG/ACT inhaler INHALE TWO PUFFS BY MOUTH EVERY FOUR HOURS AS NEEDED FOR WHEEZING OR SHORTNESS OF BREATH 8.5 g 5    Ascorbic Acid (VITAMIN C) 1000 MG tablet Take 1,000 mg by mouth daily.  vitamin B-12 (CYANOCOBALAMIN) 100 MCG tablet Take 100 mcg by mouth daily.  Pyridoxine HCl (VITAMIN B-6) 50 MG tablet Take 100 mg by mouth daily.  ferrous sulfate 325 (65 FE) MG tablet Take 325 mg by mouth daily (with breakfast). Physical Examination     Well appearing. 1.  Right lateral thigh, left thigh and knee - round indurated pink peripherally brown papules. 2.  Right lateral breast with a stuck on appearing verrucous brown plaque. Assessment and Plan     1. Dermatofibromas    Reassurance. 2. SK (seborrheic keratosis)     Reassurance.

## 2018-09-25 ENCOUNTER — OFFICE VISIT (OUTPATIENT)
Dept: FAMILY MEDICINE CLINIC | Age: 53
End: 2018-09-25
Payer: COMMERCIAL

## 2018-09-25 VITALS
BODY MASS INDEX: 28.84 KG/M2 | TEMPERATURE: 96.3 F | OXYGEN SATURATION: 96 % | DIASTOLIC BLOOD PRESSURE: 84 MMHG | WEIGHT: 168 LBS | SYSTOLIC BLOOD PRESSURE: 121 MMHG | HEART RATE: 91 BPM | RESPIRATION RATE: 20 BRPM

## 2018-09-25 DIAGNOSIS — Z79.899 HIGH RISK MEDICATION USE: ICD-10-CM

## 2018-09-25 DIAGNOSIS — Z23 NEEDS FLU SHOT: ICD-10-CM

## 2018-09-25 DIAGNOSIS — C44.319 BASAL CELL CARCINOMA OF LEFT FOREHEAD: ICD-10-CM

## 2018-09-25 DIAGNOSIS — Z13.220 SCREENING FOR LIPID DISORDERS: ICD-10-CM

## 2018-09-25 DIAGNOSIS — F41.9 ANXIETY: ICD-10-CM

## 2018-09-25 DIAGNOSIS — D45 POLYCYTHEMIA VERA (HCC): Primary | ICD-10-CM

## 2018-09-25 PROCEDURE — 90686 IIV4 VACC NO PRSV 0.5 ML IM: CPT | Performed by: FAMILY MEDICINE

## 2018-09-25 PROCEDURE — G8427 DOCREV CUR MEDS BY ELIG CLIN: HCPCS | Performed by: FAMILY MEDICINE

## 2018-09-25 PROCEDURE — G8417 CALC BMI ABV UP PARAM F/U: HCPCS | Performed by: FAMILY MEDICINE

## 2018-09-25 PROCEDURE — 3017F COLORECTAL CA SCREEN DOC REV: CPT | Performed by: FAMILY MEDICINE

## 2018-09-25 PROCEDURE — 1036F TOBACCO NON-USER: CPT | Performed by: FAMILY MEDICINE

## 2018-09-25 PROCEDURE — 90471 IMMUNIZATION ADMIN: CPT | Performed by: FAMILY MEDICINE

## 2018-09-25 PROCEDURE — 99214 OFFICE O/P EST MOD 30 MIN: CPT | Performed by: FAMILY MEDICINE

## 2018-09-25 RX ORDER — ALPRAZOLAM 1 MG/1
1 TABLET ORAL NIGHTLY PRN
Qty: 30 TABLET | Refills: 2 | Status: SHIPPED | OUTPATIENT
Start: 2018-09-25 | End: 2018-10-25

## 2018-09-25 NOTE — PROGRESS NOTES
office at time of appointment. 6. Screening for lipid disorders  Check bloodwork for:  - Lipid Panel; Future  - TSH without Reflex; Future           Follow-up appointment:   3 mos/prn    Discussed use, benefit, and side effects of all prescribed medications. Barriers to medication compliance addressed. All patient questions answered. Pt voiced understanding. When applicable, patient's outside records were reviewed through [facility]. The patient has signed appropriate paperworks/consents. Dragon dictation software was used for parts of this progress note. All attempts were made to correct any errors and ensure accuracy.

## 2018-10-29 DIAGNOSIS — Z13.220 SCREENING FOR LIPID DISORDERS: ICD-10-CM

## 2018-10-29 DIAGNOSIS — D45 POLYCYTHEMIA VERA (HCC): ICD-10-CM

## 2018-10-29 LAB
A/G RATIO: 1.9 (ref 1.1–2.2)
ALBUMIN SERPL-MCNC: 4.5 G/DL (ref 3.4–5)
ALP BLD-CCNC: 95 U/L (ref 40–129)
ALT SERPL-CCNC: 47 U/L (ref 10–40)
ANION GAP SERPL CALCULATED.3IONS-SCNC: 15 MMOL/L (ref 3–16)
AST SERPL-CCNC: 32 U/L (ref 15–37)
BILIRUB SERPL-MCNC: 0.3 MG/DL (ref 0–1)
BUN BLDV-MCNC: 15 MG/DL (ref 7–20)
CALCIUM SERPL-MCNC: 9.5 MG/DL (ref 8.3–10.6)
CHLORIDE BLD-SCNC: 101 MMOL/L (ref 99–110)
CHOLESTEROL, TOTAL: 260 MG/DL (ref 0–199)
CO2: 24 MMOL/L (ref 21–32)
CREAT SERPL-MCNC: 0.7 MG/DL (ref 0.6–1.1)
GFR AFRICAN AMERICAN: >60
GFR NON-AFRICAN AMERICAN: >60
GLOBULIN: 2.4 G/DL
GLUCOSE BLD-MCNC: 129 MG/DL (ref 70–99)
HCT VFR BLD CALC: 43.9 % (ref 36–48)
HDLC SERPL-MCNC: 99 MG/DL (ref 40–60)
HEMOGLOBIN: 14.9 G/DL (ref 12–16)
LDL CHOLESTEROL CALCULATED: 120 MG/DL
MCH RBC QN AUTO: 35.8 PG (ref 26–34)
MCHC RBC AUTO-ENTMCNC: 33.9 G/DL (ref 31–36)
MCV RBC AUTO: 105.6 FL (ref 80–100)
PDW BLD-RTO: 13.5 % (ref 12.4–15.4)
PLATELET # BLD: 442 K/UL (ref 135–450)
PMV BLD AUTO: 8 FL (ref 5–10.5)
POTASSIUM SERPL-SCNC: 4.7 MMOL/L (ref 3.5–5.1)
RBC # BLD: 4.16 M/UL (ref 4–5.2)
SODIUM BLD-SCNC: 140 MMOL/L (ref 136–145)
TOTAL PROTEIN: 6.9 G/DL (ref 6.4–8.2)
TRIGL SERPL-MCNC: 206 MG/DL (ref 0–150)
TSH SERPL DL<=0.05 MIU/L-ACNC: 1.24 UIU/ML (ref 0.27–4.2)
VLDLC SERPL CALC-MCNC: 41 MG/DL
WBC # BLD: 5.2 K/UL (ref 4–11)

## 2018-10-31 DIAGNOSIS — R73.09 BLOOD SUGAR INCREASED: Primary | ICD-10-CM

## 2018-11-01 LAB
ESTIMATED AVERAGE GLUCOSE: 102.5 MG/DL
HBA1C MFR BLD: 5.2 %

## 2018-11-09 DIAGNOSIS — K21.9 GASTROESOPHAGEAL REFLUX DISEASE WITHOUT ESOPHAGITIS: ICD-10-CM

## 2018-11-09 DIAGNOSIS — F41.9 ANXIETY: Primary | ICD-10-CM

## 2018-11-12 RX ORDER — ALPRAZOLAM 1 MG/1
TABLET ORAL
Qty: 30 TABLET | Refills: 0 | Status: SHIPPED | OUTPATIENT
Start: 2018-11-12 | End: 2018-12-12

## 2018-11-12 RX ORDER — HYDROXYUREA 500 MG/1
CAPSULE ORAL
Qty: 30 CAPSULE | Refills: 4 | Status: SHIPPED | OUTPATIENT
Start: 2018-11-12 | End: 2019-01-29

## 2018-11-12 RX ORDER — OMEPRAZOLE 20 MG/1
CAPSULE, DELAYED RELEASE ORAL
Qty: 30 CAPSULE | Refills: 4 | Status: SHIPPED | OUTPATIENT
Start: 2018-11-12 | End: 2019-01-29

## 2018-12-13 DIAGNOSIS — K21.9 GASTROESOPHAGEAL REFLUX DISEASE WITHOUT ESOPHAGITIS: ICD-10-CM

## 2018-12-13 RX ORDER — HYDROXYUREA 500 MG/1
CAPSULE ORAL
Qty: 30 CAPSULE | Refills: 5 | Status: SHIPPED | OUTPATIENT
Start: 2018-12-13 | End: 2020-09-09

## 2018-12-13 RX ORDER — OMEPRAZOLE 20 MG/1
CAPSULE, DELAYED RELEASE ORAL
Qty: 30 CAPSULE | Refills: 5 | Status: SHIPPED | OUTPATIENT
Start: 2018-12-13 | End: 2021-09-24

## 2019-01-17 ENCOUNTER — HOSPITAL ENCOUNTER (OUTPATIENT)
Dept: MAMMOGRAPHY | Age: 54
Discharge: HOME | End: 2019-01-17
Payer: COMMERCIAL

## 2019-01-17 DIAGNOSIS — Z12.31 VISIT FOR SCREENING MAMMOGRAM: ICD-10-CM

## 2019-01-17 PROCEDURE — 77063 BREAST TOMOSYNTHESIS BI: CPT

## 2019-01-23 ENCOUNTER — OFFICE VISIT (OUTPATIENT)
Dept: DERMATOLOGY | Age: 54
End: 2019-01-23
Payer: COMMERCIAL

## 2019-01-23 DIAGNOSIS — Z85.828 HISTORY OF BASAL CELL CARCINOMA (BCC): ICD-10-CM

## 2019-01-23 DIAGNOSIS — D48.5 NEOPLASM OF UNCERTAIN BEHAVIOR OF SKIN: Primary | ICD-10-CM

## 2019-01-23 DIAGNOSIS — L82.1 SK (SEBORRHEIC KERATOSIS): ICD-10-CM

## 2019-01-23 PROCEDURE — 11103 TANGNTL BX SKIN EA SEP/ADDL: CPT | Performed by: DERMATOLOGY

## 2019-01-23 PROCEDURE — 3017F COLORECTAL CA SCREEN DOC REV: CPT | Performed by: DERMATOLOGY

## 2019-01-23 PROCEDURE — G8417 CALC BMI ABV UP PARAM F/U: HCPCS | Performed by: DERMATOLOGY

## 2019-01-23 PROCEDURE — 99213 OFFICE O/P EST LOW 20 MIN: CPT | Performed by: DERMATOLOGY

## 2019-01-23 PROCEDURE — 1036F TOBACCO NON-USER: CPT | Performed by: DERMATOLOGY

## 2019-01-23 PROCEDURE — G8427 DOCREV CUR MEDS BY ELIG CLIN: HCPCS | Performed by: DERMATOLOGY

## 2019-01-23 PROCEDURE — G8482 FLU IMMUNIZE ORDER/ADMIN: HCPCS | Performed by: DERMATOLOGY

## 2019-01-23 PROCEDURE — 11102 TANGNTL BX SKIN SINGLE LES: CPT | Performed by: DERMATOLOGY

## 2019-01-25 LAB — DERMATOLOGY PATHOLOGY REPORT: ABNORMAL

## 2019-01-29 ENCOUNTER — OFFICE VISIT (OUTPATIENT)
Dept: FAMILY MEDICINE CLINIC | Age: 54
End: 2019-01-29
Payer: COMMERCIAL

## 2019-01-29 VITALS
DIASTOLIC BLOOD PRESSURE: 63 MMHG | WEIGHT: 174.2 LBS | BODY MASS INDEX: 29.9 KG/M2 | HEART RATE: 81 BPM | TEMPERATURE: 95.9 F | OXYGEN SATURATION: 98 % | SYSTOLIC BLOOD PRESSURE: 131 MMHG | RESPIRATION RATE: 12 BRPM

## 2019-01-29 DIAGNOSIS — M19.042 PRIMARY OSTEOARTHRITIS OF BOTH HANDS: ICD-10-CM

## 2019-01-29 DIAGNOSIS — G89.29 CHRONIC BILATERAL LOW BACK PAIN WITHOUT SCIATICA: ICD-10-CM

## 2019-01-29 DIAGNOSIS — G43.109 MIGRAINE WITH AURA AND WITHOUT STATUS MIGRAINOSUS, NOT INTRACTABLE: ICD-10-CM

## 2019-01-29 DIAGNOSIS — F41.9 ANXIETY: ICD-10-CM

## 2019-01-29 DIAGNOSIS — M54.50 CHRONIC BILATERAL LOW BACK PAIN WITHOUT SCIATICA: ICD-10-CM

## 2019-01-29 DIAGNOSIS — F51.01 PRIMARY INSOMNIA: Primary | ICD-10-CM

## 2019-01-29 DIAGNOSIS — D45 POLYCYTHEMIA VERA (HCC): ICD-10-CM

## 2019-01-29 DIAGNOSIS — M19.041 PRIMARY OSTEOARTHRITIS OF BOTH HANDS: ICD-10-CM

## 2019-01-29 PROCEDURE — 1036F TOBACCO NON-USER: CPT | Performed by: FAMILY MEDICINE

## 2019-01-29 PROCEDURE — G8417 CALC BMI ABV UP PARAM F/U: HCPCS | Performed by: FAMILY MEDICINE

## 2019-01-29 PROCEDURE — G8482 FLU IMMUNIZE ORDER/ADMIN: HCPCS | Performed by: FAMILY MEDICINE

## 2019-01-29 PROCEDURE — G8427 DOCREV CUR MEDS BY ELIG CLIN: HCPCS | Performed by: FAMILY MEDICINE

## 2019-01-29 PROCEDURE — 99214 OFFICE O/P EST MOD 30 MIN: CPT | Performed by: FAMILY MEDICINE

## 2019-01-29 PROCEDURE — 3017F COLORECTAL CA SCREEN DOC REV: CPT | Performed by: FAMILY MEDICINE

## 2019-01-29 RX ORDER — VITAMIN B COMPLEX
1 CAPSULE ORAL DAILY
COMMUNITY
End: 2022-09-23

## 2019-01-29 RX ORDER — CHLORAL HYDRATE 500 MG
3000 CAPSULE ORAL 3 TIMES DAILY
COMMUNITY
End: 2020-09-09

## 2019-01-29 RX ORDER — CYCLOBENZAPRINE HCL 10 MG
10 TABLET ORAL 3 TIMES DAILY PRN
Qty: 30 TABLET | Refills: 0 | Status: SHIPPED | OUTPATIENT
Start: 2019-01-29 | End: 2019-02-08

## 2019-01-29 RX ORDER — CELECOXIB 200 MG/1
200 CAPSULE ORAL DAILY
Qty: 30 CAPSULE | Refills: 5 | Status: SHIPPED | OUTPATIENT
Start: 2019-01-29 | End: 2019-08-12 | Stop reason: SDUPTHER

## 2019-01-29 RX ORDER — TOPIRAMATE 25 MG/1
TABLET ORAL
Qty: 70 TABLET | Refills: 0 | Status: SHIPPED | OUTPATIENT
Start: 2019-01-29 | End: 2019-02-19 | Stop reason: SDUPTHER

## 2019-02-01 ENCOUNTER — PROCEDURE VISIT (OUTPATIENT)
Dept: DERMATOLOGY | Age: 54
End: 2019-02-01
Payer: COMMERCIAL

## 2019-02-01 DIAGNOSIS — C44.519 BASAL CELL CARCINOMA (BCC) OF CLAVICULAR AREA: Primary | ICD-10-CM

## 2019-02-01 DIAGNOSIS — C44.91 NODULAR BASAL CELL CARCINOMA: Primary | ICD-10-CM

## 2019-02-01 PROCEDURE — 17261 DSTRJ MAL LES T/A/L .6-1.0CM: CPT | Performed by: DERMATOLOGY

## 2019-02-14 ENCOUNTER — PROCEDURE VISIT (OUTPATIENT)
Dept: SURGERY | Age: 54
End: 2019-02-14
Payer: COMMERCIAL

## 2019-02-14 VITALS
TEMPERATURE: 97.8 F | SYSTOLIC BLOOD PRESSURE: 127 MMHG | BODY MASS INDEX: 29.87 KG/M2 | OXYGEN SATURATION: 98 % | WEIGHT: 174 LBS | DIASTOLIC BLOOD PRESSURE: 78 MMHG | HEART RATE: 81 BPM

## 2019-02-14 DIAGNOSIS — C44.319 BASAL CELL CARCINOMA OF FOREHEAD: Primary | ICD-10-CM

## 2019-02-14 PROCEDURE — 17311 MOHS 1 STAGE H/N/HF/G: CPT | Performed by: DERMATOLOGY

## 2019-02-14 PROCEDURE — 12051 INTMD RPR FACE/MM 2.5 CM/<: CPT | Performed by: DERMATOLOGY

## 2019-02-15 ENCOUNTER — TELEPHONE (OUTPATIENT)
Dept: SURGERY | Age: 54
End: 2019-02-15

## 2019-02-22 RX ORDER — TOPIRAMATE 25 MG/1
TABLET ORAL
Qty: 70 TABLET | Refills: 0 | Status: SHIPPED | OUTPATIENT
Start: 2019-02-22 | End: 2019-05-06

## 2019-02-28 ENCOUNTER — OFFICE VISIT (OUTPATIENT)
Dept: FAMILY MEDICINE CLINIC | Age: 54
End: 2019-02-28
Payer: COMMERCIAL

## 2019-02-28 VITALS
RESPIRATION RATE: 20 BRPM | SYSTOLIC BLOOD PRESSURE: 136 MMHG | WEIGHT: 168 LBS | HEART RATE: 84 BPM | TEMPERATURE: 96.5 F | OXYGEN SATURATION: 95 % | DIASTOLIC BLOOD PRESSURE: 84 MMHG | BODY MASS INDEX: 28.84 KG/M2

## 2019-02-28 DIAGNOSIS — F51.01 PRIMARY INSOMNIA: Primary | ICD-10-CM

## 2019-02-28 DIAGNOSIS — D75.839 THROMBOCYTOSIS: ICD-10-CM

## 2019-02-28 DIAGNOSIS — R63.5 WEIGHT GAIN: ICD-10-CM

## 2019-02-28 PROCEDURE — G8417 CALC BMI ABV UP PARAM F/U: HCPCS | Performed by: FAMILY MEDICINE

## 2019-02-28 PROCEDURE — 99214 OFFICE O/P EST MOD 30 MIN: CPT | Performed by: FAMILY MEDICINE

## 2019-02-28 PROCEDURE — 1036F TOBACCO NON-USER: CPT | Performed by: FAMILY MEDICINE

## 2019-02-28 PROCEDURE — 3017F COLORECTAL CA SCREEN DOC REV: CPT | Performed by: FAMILY MEDICINE

## 2019-02-28 PROCEDURE — G8482 FLU IMMUNIZE ORDER/ADMIN: HCPCS | Performed by: FAMILY MEDICINE

## 2019-02-28 PROCEDURE — G8427 DOCREV CUR MEDS BY ELIG CLIN: HCPCS | Performed by: FAMILY MEDICINE

## 2019-02-28 RX ORDER — TOPIRAMATE 100 MG/1
100 TABLET, FILM COATED ORAL DAILY
Qty: 30 TABLET | Refills: 5 | Status: SHIPPED | OUTPATIENT
Start: 2019-02-28 | End: 2019-08-12 | Stop reason: SDUPTHER

## 2019-05-02 ENCOUNTER — PATIENT MESSAGE (OUTPATIENT)
Dept: FAMILY MEDICINE CLINIC | Age: 54
End: 2019-05-02

## 2019-05-06 RX ORDER — TOPIRAMATE 100 MG/1
100 CAPSULE, EXTENDED RELEASE ORAL DAILY
Qty: 30 CAPSULE | Refills: 5 | Status: SHIPPED | OUTPATIENT
Start: 2019-05-06 | End: 2020-09-09

## 2019-08-12 RX ORDER — TOPIRAMATE 100 MG/1
100 TABLET, FILM COATED ORAL DAILY
Qty: 90 TABLET | Refills: 0 | Status: SHIPPED | OUTPATIENT
Start: 2019-08-12 | End: 2019-08-14 | Stop reason: SDUPTHER

## 2019-08-14 RX ORDER — TOPIRAMATE 100 MG/1
100 TABLET, FILM COATED ORAL DAILY
Qty: 90 TABLET | Refills: 3 | Status: SHIPPED | OUTPATIENT
Start: 2019-08-14 | End: 2020-09-09

## 2020-01-08 ENCOUNTER — HOSPITAL ENCOUNTER (OUTPATIENT)
Dept: CT IMAGING | Age: 55
Discharge: HOME | End: 2020-01-08
Payer: COMMERCIAL

## 2020-01-08 PROCEDURE — 71260 CT THORAX DX C+: CPT

## 2020-01-08 PROCEDURE — 6360000004 HC RX CONTRAST MEDICATION: Performed by: INTERNAL MEDICINE

## 2020-01-08 RX ADMIN — IOPAMIDOL 80 ML: 755 INJECTION, SOLUTION INTRAVENOUS at 08:22

## 2020-03-09 RX ORDER — CELECOXIB 200 MG/1
CAPSULE ORAL
Qty: 30 CAPSULE | Refills: 0 | Status: SHIPPED | OUTPATIENT
Start: 2020-03-09 | End: 2020-09-09

## 2020-05-26 ENCOUNTER — E-VISIT (OUTPATIENT)
Dept: FAMILY MEDICINE CLINIC | Age: 55
End: 2020-05-26
Payer: COMMERCIAL

## 2020-05-26 PROCEDURE — 99422 OL DIG E/M SVC 11-20 MIN: CPT | Performed by: FAMILY MEDICINE

## 2020-05-26 RX ORDER — CYCLOBENZAPRINE HCL 5 MG
5 TABLET ORAL 3 TIMES DAILY PRN
Qty: 30 TABLET | Refills: 0 | Status: SHIPPED | OUTPATIENT
Start: 2020-05-26 | End: 2020-06-05

## 2020-05-26 NOTE — PATIENT INSTRUCTIONS
Patient Education        Learning About How to Have a Healthy Back  What causes back pain? Back pain is often caused by overuse, strain, or injury. For example, people often hurt their backs playing sports or working in the yard, being jolted in a car accident, or lifting something too heavy. Aging plays a part too. Your bones and muscles tend to lose strength as you age, which makes injury more likely. The spongy discs between the bones of the spine (vertebrae) may suffer from wear and tear and no longer provide enough cushion between the bones. A disc that bulges or breaks open (herniated disc) can press on nerves, causing back pain. In some people, back pain is the result of arthritis, broken vertebrae caused by bone loss (osteoporosis), illness, or a spine problem. Although most people have back pain at one time or another, there are steps you can take to make it less likely. How can you have a healthy back? Reduce stress on your back through good posture   Slumping or slouching alone may not cause low back pain. But after the back has been strained or injured, bad posture can make pain worse. · Sleep in a position that maintains your back's normal curves and on a mattress that feels comfortable. Sleep on your side with a pillow between your knees, or sleep on your back with a pillow under your knees. These positions can reduce strain on your back. · Stand and sit up straight. \"Good posture\" generally means your ears, shoulders, and hips are in a straight line. · If you must stand for a long time, put one foot on a stool, ledge, or box. Switch feet every now and then. · Sit in a chair that is low enough to let you place both feet flat on the floor with both knees nearly level with your hips. If your chair or desk is too high, use a footrest to raise your knees. Place a small pillow, a rolled-up towel, or a lumbar roll in the curve of your back if you need extra support.   · Try a kneeling chair, which helps tilt your hips forward. This takes pressure off your lower back. · Try sitting on an exercise ball. It can rock from side to side, which helps keep your back loose. · When driving, keep your knees nearly level with your hips. Sit straight, and drive with both hands on the steering wheel. Your arms should be in a slightly bent position. Reduce stress on your back through careful lifting   · Squat down, bending at the hips and knees only. If you need to, put one knee to the floor and extend your other knee in front of you, bent at a right angle (half kneeling). · Press your chest straight forward. This helps keep your upper back straight while keeping a slight arch in your low back. · Hold the load as close to your body as possible, at the level of your belly button (navel). · Use your feet to change direction, taking small steps. · Lead with your hips as you change direction. Keep your shoulders in line with your hips as you move. · Set down your load carefully, squatting with your knees and hips only. Exercise and stretch your back   · Do some exercise on most days of the week, if your doctor says it is okay. You can walk, run, swim, or cycle. · Stretch your back muscles. Here are a few exercises to try:  ? Lie on your back, and gently pull one bent knee to your chest. Put that foot back on the floor, and then pull the other knee to your chest.  ? Do pelvic tilts. Lie on your back with your knees bent. Tighten your stomach muscles. Pull your belly button (navel) in and up toward your ribs. You should feel like your back is pressing to the floor and your hips and pelvis are slightly lifting off the floor. Hold for 6 seconds while breathing smoothly. ? Sit with your back flat against a wall. · Keep your core muscles strong. The muscles of your back, belly (abdomen), and buttocks support your spine. ? Pull in your belly and imagine pulling your navel toward your spine.  Hold this for 6 seconds, then relax. Remember to keep breathing normally as you tense your muscles. ? Do curl-ups. Always do them with your knees bent. Keep your low back on the floor, and curl your shoulders toward your knees using a smooth, slow motion. Keep your arms folded across your chest. If this bothers your neck, try putting your hands behind your neck (not your head), with your elbows spread apart. ? Lie on your back with your knees bent and your feet flat on the floor. Tighten your belly muscles, and then push with your feet and raise your buttocks up a few inches. Hold this position 6 seconds as you continue to breathe normally, then lower yourself slowly to the floor. Repeat 8 to 12 times. ? If you like group exercise, try Pilates or yoga. These classes have poses that strengthen the core muscles. Lead a healthy lifestyle   · Stay at a healthy weight to avoid strain on your back. · Do not smoke. Smoking increases the risk of osteoporosis, which weakens the spine. If you need help quitting, talk to your doctor about stop-smoking programs and medicines. These can increase your chances of quitting for good. Where can you learn more? Go to https://Noveda TechnologiespeSypherlink.Mindie. org and sign in to your SmartAngels.fr account. Enter L315 in the Regalister box to learn more about \"Learning About How to Have a Healthy Back. \"     If you do not have an account, please click on the \"Sign Up Now\" link. Current as of: March 2, 2020               Content Version: 12.5  © 2006-2020 Healthwise, Incorporated. Care instructions adapted under license by [facility] [place] ([facility]). If you have questions about a medical condition or this instruction, always ask your healthcare professional. [place] [address] any warranty or liability for your use of this information.          Patient Education        Back Pain, Emergency or Urgent Symptoms: Care Instructions  Your Care Instructions     Many people have back pain at one time or another. In most cases, pain gets better with self-care that includes over-the-counter pain medicine, ice, heat, and exercises. Unless you have symptoms of a severe injury or heart attack, you may be able to give yourself a few days before you call a doctor. But some back problems are very serious. Do not ignore symptoms that need to be checked right away. Follow-up care is a key part of your treatment and safety. Be sure to make and go to all appointments, and call your doctor if you are having problems. It's also a good idea to know your test results and keep a list of the medicines you take. How can you care for yourself at home? · Sit or lie in positions that are most comfortable and that reduce your pain. Try one of these positions when you lie down:  ? Lie on your back with your knees bent and supported by large pillows. ? Lie on the floor with your legs on the seat of a sofa or chair. ? Lie on your side with your knees and hips bent and a pillow between your legs. ? Lie on your stomach if it does not make pain worse. · Do not sit up in bed, and avoid soft couches and twisted positions. Bed rest can help relieve pain at first, but it delays healing. Avoid bed rest after the first day. · Change positions every 30 minutes. If you must sit for long periods of time, take breaks from sitting. Get up and walk around, or lie flat. · Try using a heating pad on a low or medium setting, for 15 to 20 minutes every 2 or 3 hours. Try a warm shower in place of one session with the heating pad. You can also buy single-use heat wraps that last up to 8 hours. You can also try ice or cold packs on your back for 10 to 20 minutes at a time, several times a day. (Put a thin cloth between the ice pack and your skin.) This reduces pain and makes it easier to be active and exercise. · Take pain medicines exactly as directed. ? If the doctor gave you a prescription medicine for pain, take it as prescribed.   ? If you are not taking a prescription pain medicine, ask your doctor if you can take an over-the-counter medicine. When should you call for help? [id number] anytime you think you may need emergency care. For example, call if:  · You are unable to move a leg at all. · You have back pain with severe belly pain. · You have symptoms of a heart attack. These may include:  ? Chest pain or pressure, or a strange feeling in the chest.  ? Sweating. ? Shortness of breath. ? Nausea or vomiting. ? Pain, pressure, or a strange feeling in the back, neck, jaw, or upper belly or in one or both shoulders or arms. ? Lightheadedness or sudden weakness. ? A fast or irregular heartbeat. After you call 911, the  may tell you to chew 1 adult-strength or 2 to 4 low-dose aspirin. Wait for an ambulance. Do not try to drive yourself. Call your doctor now or seek immediate medical care if:  · You have new or worse symptoms in your arms, legs, chest, belly, or buttocks. Symptoms may include:  ? Numbness or tingling. ? Weakness. ? Pain. · You lose bladder or bowel control. · You have back pain and:  ? You have injured your back while lifting or doing some other activity. Call if the pain is severe, has not gone away after 1 or 2 days, and you cannot do your normal daily activities. ? You have had a back injury before that needed treatment. ? Your pain has lasted longer than 4 weeks. ? You have had weight loss you cannot explain. ? You have a fever. ? You are age 48 or older. ? You have cancer now or have had it before. Watch closely for changes in your health, and be sure to contact your doctor if you are not getting better as expected. Where can you learn more? Go to https://Smart Eyenakul.George Mobile. org and sign in to your Appwapp account. Enter Q220 in the ClickFox box to learn more about \"Back Pain, Emergency or Urgent Symptoms: Care Instructions. \"     If you do not have an account, please click on the relaxed posture on the ball. You can use this position as your starting point for other ball exercises. If you feel unsteady on the ball, start on a chair first.  2. Sit on a ball or chair, with your feet planted straight in front of you. 3. Imagine that a string at the top of your head is pulling you straight up. Think of yourself as 2 inches taller than you are.  4. Slightly tuck your chin. 5. Keep your shoulders back and relaxed. Knee extension   1. Sit tall on the ball with your feet planted in front of you, hip-width apart. As you do this exercise, avoid slumping your shoulders and arching your back. 2. Rest your hands on the ball near your hip or a steady object next to you. (If you feel very stable on the ball, rest your hands in your lap or at your side.)  3. Slowly straighten one leg at the knee. Slowly lower it back down. Repeat with the other leg. 4. Repeat this exercise 8 to 12 times. Roll-ups   1. Lie on your back with your knees bent, feet resting on the floor. 2. Lay the ball on your thighs. Rest your hands up high on the ball. 3. Raising your head and shoulder blades, roll the ball up your thighs. Exhale as you roll up. 4. If this is hard on your neck, gently support your lower head and upper neck with one hand. Don't use that hand to pull your head up. 5. Repeat 8 to 12 times. Ball curls   1. Lie on your back with your ankles resting on the ball, knees straight. 2. Use your legs to roll the exercise ball toward you. Allow your knees to bend and move closer to your chest.  3. Pause briefly, and then roll the ball to the starting position. Try to keep the ball rolling straight. You will feel the muscles in your lower belly working. 4. Repeat 8 to 12 times. Bridge with ball under legs   1. Lie on your back with your legs up, calves resting on the ball. For more challenge, rest your heels on the ball. 2. Look up at the ceiling, and keep your chin relaxed.  You can place a small your knees in place. 4. Press on the ball with your arms. Lean forward from the knees. This rolls the ball forward. You will bear most of your weight on your arms. 5. If your back starts to ache, you've gone too far. Pull back a bit. 6. Roll back to the start position. 7. Repeat 8 to 12 times. Walk-out plank on ball   1. Kneel over the ball. Place your hands on the floor in front of you. 2. Walk your hands forward until your legs are straight on the ball. This is the plank position. 3. When in plank position, hold your body straight and tighten your belly and buttocks muscles. Keep your chin slightly tucked. 4. Roll as far forward as you can without losing your balance or letting your hips drop. You may stop with the ball under your thighs, or even under your knees or shins. 5. Hold a few seconds, then walk your hands back and return to the start position. 6. Repeat 8 to 12 times. Push-up with thighs on ball   1. Kneel over the ball. Place your hands on the floor in front of you. 2. Walk your hands forward until your legs are straight on the ball. This is the plank position. 3. When in plank position, hold your body straight and tighten your belly and buttocks muscles. Keep your chin slightly tucked. 4. Roll as far forward as you can without losing your balance or letting your hips drop. You may stop with the ball under your thighs, or even under your knees or shins. 5. Bend your elbows. Slowly lower your body toward the ground as far as you can without losing your balance. 6. If your wrists hurt, try moving your hands a little farther apart so they're not right under your shoulders. 7. Slowly straighten your arms. 8. Do 8 to 12 of these push-ups. Wall squat with ball   1. Stand facing away from a wall. Place your feet about shoulder-width apart. 2. Place the ball between your middle back and the wall.  Move your feet out in front of you so they are about a foot in front of your buttocks. 2. Cross your arms over your chest. If this bothers your neck, try putting your hands behind your neck (not your head), with your elbows spread apart. 3. Slowly tighten your belly muscles and raise your shoulder blades off the floor. 4. Keep your head in line with your body, and do not press your chin to your chest.  5. Hold this position for 1 or 2 seconds, then slowly lower yourself back down to the floor. 6. Repeat 8 to 12 times. Pelvic tilt exercise   1. Lie on your back with your knees bent. 2. \"Brace\" your stomach. This means to tighten your muscles by pulling in and imagining your belly button moving toward your spine. You should feel like your back is pressing to the floor and your hips and pelvis are rocking back. 3. Hold for about 6 seconds while you breathe smoothly. 4. Repeat 8 to 12 times. Heel dig bridging   1. Lie on your back with both knees bent and your ankles bent so that only your heels are digging into the floor. Your knees should be bent about 90 degrees. 2. Then push your heels into the floor, squeeze your buttocks, and lift your hips off the floor until your shoulders, hips, and knees are all in a straight line. 3. Hold for about 6 seconds as you continue to breathe normally, and then slowly lower your hips back down to the floor and rest for up to 10 seconds. 4. Do 8 to 12 repetitions. Hamstring stretch in doorway   1. Lie on your back in a doorway, with one leg through the open door. 2. Slide your leg up the wall to straighten your knee. You should feel a gentle stretch down the back of your leg. 3. Hold the stretch for at least 15 to 30 seconds. Do not arch your back, point your toes, or bend either knee. Keep one heel touching the floor and the other heel touching the wall. 4. Repeat with your other leg. 5. Do 2 to 4 times for each leg. Hip flexor stretch   1. Kneel on the floor with one knee bent and one leg behind you.  Place your forward knee over your foot. Keep your other knee touching the floor. 2. Slowly push your hips forward until you feel a stretch in the upper thigh of your rear leg. 3. Hold the stretch for at least 15 to 30 seconds. Repeat with your other leg. 4. Do 2 to 4 times on each side. Wall sit   1. Stand with your back 10 to 12 inches away from a wall. 2. Lean into the wall until your back is flat against it. 3. Slowly slide down until your knees are slightly bent, pressing your lower back into the wall. 4. Hold for about 6 seconds, then slide back up the wall. 5. Repeat 8 to 12 times. Follow-up care is a key part of your treatment and safety. Be sure to make and go to all appointments, and call your doctor if you are having problems. It's also a good idea to know your test results and keep a list of the medicines you take. Where can you learn more? Go to https://Where.PlumTV. org and sign in to your ParStream account. Enter F298 in the AmberAds box to learn more about \"Low Back Pain: Exercises. \"     If you do not have an account, please click on the \"Sign Up Now\" link. Current as of: March 2, 2020               Content Version: 12.5  © 0567-0090 Healthwise, Incorporated. Care instructions adapted under license by [facility] ([facility]). If you have questions about a medical condition or this instruction, always ask your healthcare professional. [place] [address] any warranty or liability for your use of this information. NSAID-type medications have  important potential side effects: gastrointestinal irritation including hemorrhage, myocardial injury including possible infarction, and kidney injury. Take the medication with food, avoid any other NSAIDs or steroids, and discontinue if any concerning symptoms develop.  Immediately stop the medication and proceed to the emergency department for chest pain, dark urine or difficulty with producing urine, vomiting, abdominal pain or black/tarry stools.

## 2020-08-11 ENCOUNTER — OFFICE VISIT (OUTPATIENT)
Dept: PRIMARY CARE CLINIC | Age: 55
End: 2020-08-11
Payer: COMMERCIAL

## 2020-08-11 PROCEDURE — 99211 OFF/OP EST MAY X REQ PHY/QHP: CPT | Performed by: NURSE PRACTITIONER

## 2020-08-11 NOTE — PROGRESS NOTES
Sammi Denver received a viral test for COVID-19. They were educated on isolation and quarantine as appropriate. For any symptoms, they were directed to seek care from their PCP, given contact information to establish with a doctor, directed to an urgent care or the emergency room.

## 2020-08-12 LAB
SARS-COV-2: NOT DETECTED
SOURCE: NORMAL

## 2020-09-08 ENCOUNTER — TELEPHONE (OUTPATIENT)
Dept: DERMATOLOGY | Age: 55
End: 2020-09-08

## 2020-09-08 NOTE — TELEPHONE ENCOUNTER
Pt lmvm  Pt c/b [phone]  Pt states:   - has a growth on cheek   - is growing   - last 2 mths   - needs an appt  Please call to discuss thanks

## 2020-09-08 NOTE — TELEPHONE ENCOUNTER
Called and left message for patient to call back office. Can offer cancellation for 9/9 or 9/10 if still available.

## 2020-09-09 ENCOUNTER — OFFICE VISIT (OUTPATIENT)
Dept: DERMATOLOGY | Age: 55
End: 2020-09-09
Payer: COMMERCIAL

## 2020-09-09 VITALS — TEMPERATURE: 98.8 F

## 2020-09-09 PROCEDURE — 99213 OFFICE O/P EST LOW 20 MIN: CPT | Performed by: DERMATOLOGY

## 2020-09-09 PROCEDURE — 17110 DESTRUCTION B9 LES UP TO 14: CPT | Performed by: DERMATOLOGY

## 2020-09-09 NOTE — PROGRESS NOTES
[facility]  Alcira Jean MD  [address]  1965    60 y.o. female     Date of Visit: 2020    Chief Complaint: skin lesion    History of Present Illness:    1. She complains of a few month history of a changing crusted lesion on the right central cheek. 2.  She also reports a painless lesion on the upper portion of the back. 3.  She has a history of BCCs-denies any signs of recurrence. Nodular BCC on the central forehead-treated with Mohs by Dr. Kmimie Hamilton on 2019. Superficial basal cell carcinoma on the right clavicular region-treated with curettage on 2019. Nodular BCC on the right side of the forehead-treated with Mohs by Dr. Kimmie Hamilton on 2018. Nodular BCC on the left side of the forehead-treated with Mohs by Dr. Kimmie Hamilton on 2018. Has 2 grandchildren in [place]. Review of Systems:  Skin: No new or changing moles. Past Medical History, Family History, Surgical History, Medications and Allergies reviewed.     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    Polycythemia vera(238.4)     Reactive airway disease 2018    Thrombocytosis ([address]      Past Surgical History:   Procedure Laterality Date    BREAST BIOPSY  2011    bilateral  X 5 - benign    BREAST BIOPSY Bilateral 2015    phyllodes tumor Left    BREAST BIOPSY Left 8 18 15    Needle loc    BREAST LUMPECTOMY Left 8 18 15    benign tumor removal     SECTION      X4     KNEE ARTHROSCOPY      right    MANDIBLE SURGERY      X2       No Known Allergies  Outpatient Medications Marked as Taking for the 20 encounter (Office Visit) with [place] Cortez Hurt MD   Medication Sig Dispense Refill    hydroxyurea (HYDREA) 500 MG chemo capsule TAKE ONE CAPSULE BY MOUTH DAILY OR AS DIRECTED BASED ON BLOOD WORK 30 capsule 5    b complex vitamins capsule Take 1 capsule by mouth daily      omeprazole (PRILOSEC) 20 MG delayed release capsule TAKE 1 CAPSULE BY MOUTH DAILY 30 capsule 5    ferrous sulfate 325 (65 FE) MG tablet Take 325 mg by mouth daily (with breakfast). Physical Examination       The following were examined and determined to be normal: Psych/Neuro, Scalp/hair, Conjunctivae/eyelids, Gums/teeth/lips, Neck, Breast/axilla/chest, Back, RUE and LUE. The following were examined and determined to be abnormal: Head/face. Well-appearing. 1.  Right central cheek with a stuck on appearing crusted verrucous pink-brown papule. 2.  Upper portion of the back with a stuck on appearing verrucous thin brown plaque. 3.  Clear. Assessment and Plan     1. Inflamed seborrheic keratosis     2 cycles of liquid nitrogen applied to 1 ISK on the right cheek. Patient was educated regarding the potential risks of blister formation, discomfort, hypopigmentation, and scar. Wound care was discussed. 2. SK (seborrheic keratosis)     Reassurance. 3. History of basal cell carcinoma - clear    Sun protective behaviors and self skin examinations were encouraged. Call for any new or concerning lesions. Return in about 1 year (around 9/9/2021).

## 2021-01-28 ENCOUNTER — HOSPITAL ENCOUNTER (OUTPATIENT)
Dept: ULTRASOUND IMAGING | Age: 56
Discharge: HOME | End: 2021-01-28
Payer: COMMERCIAL

## 2021-01-28 ENCOUNTER — HOSPITAL ENCOUNTER (OUTPATIENT)
Dept: MAMMOGRAPHY | Age: 56
Discharge: HOME | End: 2021-01-28
Payer: COMMERCIAL

## 2021-01-28 DIAGNOSIS — Z12.31 VISIT FOR SCREENING MAMMOGRAM: ICD-10-CM

## 2021-01-28 DIAGNOSIS — R92.8 ABNORMAL MAMMOGRAM: ICD-10-CM

## 2021-01-28 DIAGNOSIS — R92.8 ABNORMAL FINDING ON MAMMOGRAPHY: ICD-10-CM

## 2021-01-28 PROCEDURE — 77063 BREAST TOMOSYNTHESIS BI: CPT

## 2021-01-28 PROCEDURE — 76642 ULTRASOUND BREAST LIMITED: CPT

## 2021-01-28 PROCEDURE — G0279 TOMOSYNTHESIS, MAMMO: HCPCS

## 2021-03-01 ENCOUNTER — OFFICE VISIT (OUTPATIENT)
Dept: FAMILY MEDICINE CLINIC | Age: 56
End: 2021-03-01
Payer: COMMERCIAL

## 2021-03-01 VITALS
BODY MASS INDEX: 29.02 KG/M2 | HEART RATE: 85 BPM | HEIGHT: 64 IN | OXYGEN SATURATION: 98 % | WEIGHT: 170 LBS | DIASTOLIC BLOOD PRESSURE: 82 MMHG | SYSTOLIC BLOOD PRESSURE: 124 MMHG | TEMPERATURE: 96.8 F | RESPIRATION RATE: 16 BRPM

## 2021-03-01 DIAGNOSIS — Z00.00 ROUTINE GENERAL MEDICAL EXAMINATION AT A HEALTH CARE FACILITY: Primary | ICD-10-CM

## 2021-03-01 DIAGNOSIS — D47.3 ESSENTIAL THROMBOCYTOSIS (HCC): ICD-10-CM

## 2021-03-01 DIAGNOSIS — R23.2 HOT FLASHES: ICD-10-CM

## 2021-03-01 DIAGNOSIS — F41.9 ANXIETY: ICD-10-CM

## 2021-03-01 DIAGNOSIS — D45 POLYCYTHEMIA VERA (HCC): ICD-10-CM

## 2021-03-01 DIAGNOSIS — G43.109 MIGRAINE WITH AURA AND WITHOUT STATUS MIGRAINOSUS, NOT INTRACTABLE: ICD-10-CM

## 2021-03-01 DIAGNOSIS — Z23 NEED FOR SHINGLES VACCINE: ICD-10-CM

## 2021-03-01 DIAGNOSIS — Z12.4 SCREENING FOR CERVICAL CANCER: ICD-10-CM

## 2021-03-01 PROCEDURE — 99396 PREV VISIT EST AGE 40-64: CPT | Performed by: FAMILY MEDICINE

## 2021-03-01 ASSESSMENT — PATIENT HEALTH QUESTIONNAIRE - PHQ9
1. LITTLE INTEREST OR PLEASURE IN DOING THINGS: 0
2. FEELING DOWN, DEPRESSED OR HOPELESS: 0

## 2021-03-01 NOTE — PROGRESS NOTES
Here for well checkup, physical.  Pt states that prior to [place], her company shut down and at this time, she is considering intermediate. Pt has been doing well to keeping busy, has been staying home most of the time. 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 Will rarely get to grocery store. Pt feels that she is managing COVID ok, is doing well to stay healthy, exercise. Trying to walk daily and feels well with her exercise. Pt was drinking a little bit too much and did cut down on her alcohol, and not drinking at all currently. Pt feels that she is managing the stress ok. Does have a good support system currently. Pt has having some issues with sleep, using medicinal MJ to sleep and that has been helpful. Has noted some menopausal issues with hot flashes, and can have a restless feeling. Pt getting sx frequently, better at night as she is taking medicinal MJ. These sx have been going on more with cutting down on EtOH. Pt is trying to do natural therapy. Pt is not interested in HRT due to risk factors. Pt is regularly seeing heme/onc for f/u of thrombocytosis. Everything has been stable      Except as noted in the history of present illness as above, the review of systems is negative for the following:    General ROS: negative  Psychological ROS: negative  Allergy and Immunology ROS: negative  Hematological and Lymphatic ROS: negative  Respiratory ROS: no cough, shortness of breath, or wheezing  Cardiovascular ROS: no chest pain or dyspnea on exertion  Gastrointestinal ROS: no abdominal pain, change in bowel habits, or black or bloody stools  Genito-Urinary ROS: no dysuria, trouble voiding, or hematuria  Musculoskeletal ROS: negative  Dermatological ROS: negative      Past medical, surgical, and social history reviewed and updated.    Medications and allergies reviewed and updated      /82   Pulse 85   Temp 96.8 °F (36 °C) (Oral)   Resp 16   Ht 5' 4\" (1.626 m)   Wt 170 lb (77.1 kg)   LMP 03/13/2019 (Approximate)   SpO2 98%   BMI 29.18 kg/m²   General appearance - healthy, alert, no distress  Skin - Skin color, texture, turgor normal. No rashes or lesions. No suspicious findings  Head - Normocephalic. No masses, lesions, tenderness or abnormalities  Eyes - conjunctivae/corneas clear. Pupils equal and reactive to light and accomodation, extraocular muscles intact. Ears - External ears normal. Canals clear. Tympanic membranes normal bilaterally. Nose/Sinuses - Nares normal. Septum midline. Mucosa normal. No drainage or sinus tenderness. Oropharynx - Lips, mucosa, and tongue normal. Teeth and gums normal.   Neck - Neck supple. No adenopathy. Thyroid symmetric, normal size, no carotid bruit bilaterally  Back - Back symmetric, no curvature. Range of motion normal. No Costovertebral angle tenderness. Lungs - Percussion normal. Good diaphragmatic excursion. Lungs clear without wheeze, rales, crackles  Heart - Regular rate and rhythm, with no rub, murmur or gallop noted. Abdomen - Abdomen soft, non-tender. Bowel sounds normal. No masses, tenderness or organomegaly  Extremities - Extremities normal. No deformities, edema, or skin discoloration  Musculoskeletal - Spine ROM normal. Muscular strength intact. Peripheral pulses - radial=4/4,, femoral=4/4, popliteal=4/4, dorsalis pedis=4/4,  Neuro - Gait normal. Reflexes normal and symmetric. Sensation grossly normal.  No focal weakness  Psych - euthymic, no suicidal thoughts or ideation, mood stable. Current Outpatient Medications   Medication Sig Dispense Refill    hydroxyurea (HYDREA) 500 MG chemo capsule TAKE ONE CAPSULE BY MOUTH DAILY OR AS DIRECTED BASED ON BLOOD WORK 30 capsule 5    omeprazole (PRILOSEC) 20 MG delayed release capsule TAKE 1 CAPSULE BY MOUTH DAILY 30 capsule 5    ferrous sulfate 325 (65 FE) MG tablet Take 325 mg by mouth daily (with breakfast).       b complex vitamins capsule Take 1 capsule by mouth daily       No current facility-administered medications for this visit. ASSESSMENT / PLAN:    1. Routine general medical examination at a health care facility  No focal abnormalities on exam  Anticipatory guidance discussed. Check bloodwork as below  - CBC; Future  - Comprehensive Metabolic Panel; Future  - Lipid Panel; Future  - Hemoglobin A1C; Future  - TSH without Reflex; Future  - T4, Free; Future  - Vitamin B12 & Folate; Future    2. Polycythemia vera ([address]  Cont hydrea  Check f/u bloodwork as above  F/'u with heme/onc    3. Essential thrombocytosis (HCC)  As above    4. Anxiety  Stable, doing great w/o breakthru sx  Cont supportive therapy    5. Migraine with aura and without status migrainosus, not intractable  Stable w/o flare    6. Hot flashes  Exam nonfocal  Check bloodwork  Not a great candidate for HRT d/t risk factors,   Trial over the counter herbal supplementation as discussed and f/u 1 month for pap, and to discuss treatment options with SSRI / SNRI for persistent or increased sx  - Hemoglobin A1C; Future  - TSH without Reflex; Future  - T4, Free; Future  - Vitamin B12 & Folate; Future    7. Need for shingles vaccine  Pt is due for update on shingles vaccine. Pt is given information on Shingrix vaccine, need for 2 doses spaced 2-6 months apart, and that due to medicare requirements, they may need to get vaccine at pharmacy. Pt given information/prescription if appropriate. Limited stock availability also discussed. Hold as pt has desire to get COVID vaccine    8. Screening for cervical cancer  F/u well woman checkup 1 month           Follow-up appointment:   1 month/pending bloodwork results    Discussed use, benefit, and side effects of all prescribed medications. Barriers to medication compliance addressed. All patient questions answered. Pt voiced understanding. When applicable, patient's outside records were reviewed through [place].   The patient has signed appropriate paperworks/consents.

## 2021-03-03 DIAGNOSIS — Z00.00 ROUTINE GENERAL MEDICAL EXAMINATION AT A HEALTH CARE FACILITY: ICD-10-CM

## 2021-03-03 DIAGNOSIS — R23.2 HOT FLASHES: ICD-10-CM

## 2021-03-03 LAB
A/G RATIO: 1.7 (ref 1.1–2.2)
ALBUMIN SERPL-MCNC: 4.3 G/DL (ref 3.4–5)
ALP BLD-CCNC: 90 U/L (ref 40–129)
ALT SERPL-CCNC: 11 U/L (ref 10–40)
ANION GAP SERPL CALCULATED.3IONS-SCNC: 11 MMOL/L (ref 3–16)
AST SERPL-CCNC: 14 U/L (ref 15–37)
BILIRUB SERPL-MCNC: 0.5 MG/DL (ref 0–1)
BUN BLDV-MCNC: 13 MG/DL (ref 7–20)
CALCIUM SERPL-MCNC: 9.5 MG/DL (ref 8.3–10.6)
CHLORIDE BLD-SCNC: 103 MMOL/L (ref 99–110)
CHOLESTEROL, TOTAL: 255 MG/DL (ref 0–199)
CO2: 27 MMOL/L (ref 21–32)
CREAT SERPL-MCNC: 0.8 MG/DL (ref 0.6–1.1)
FOLATE: 17.91 NG/ML (ref 4.78–24.2)
GFR AFRICAN AMERICAN: >60
GFR NON-AFRICAN AMERICAN: >60
GLOBULIN: 2.6 G/DL
GLUCOSE BLD-MCNC: 103 MG/DL (ref 70–99)
HCT VFR BLD CALC: 44.4 % (ref 36–48)
HDLC SERPL-MCNC: 57 MG/DL (ref 40–60)
HEMOGLOBIN: 15.2 G/DL (ref 12–16)
LDL CHOLESTEROL CALCULATED: 176 MG/DL
MCH RBC QN AUTO: 33.8 PG (ref 26–34)
MCHC RBC AUTO-ENTMCNC: 34.3 G/DL (ref 31–36)
MCV RBC AUTO: 98.7 FL (ref 80–100)
PDW BLD-RTO: 15.4 % (ref 12.4–15.4)
PLATELET # BLD: 533 K/UL (ref 135–450)
PLATELET SLIDE REVIEW: ADEQUATE
PMV BLD AUTO: 8.5 FL (ref 5–10.5)
POTASSIUM SERPL-SCNC: 4.7 MMOL/L (ref 3.5–5.1)
RBC # BLD: 4.5 M/UL (ref 4–5.2)
SLIDE REVIEW: ABNORMAL
SODIUM BLD-SCNC: 141 MMOL/L (ref 136–145)
T4 FREE: 1.2 NG/DL (ref 0.9–1.8)
TOTAL PROTEIN: 6.9 G/DL (ref 6.4–8.2)
TRIGL SERPL-MCNC: 110 MG/DL (ref 0–150)
TSH SERPL DL<=0.05 MIU/L-ACNC: 1.44 UIU/ML (ref 0.27–4.2)
VITAMIN B-12: 555 PG/ML (ref 211–911)
VLDLC SERPL CALC-MCNC: 22 MG/DL
WBC # BLD: 5.4 K/UL (ref 4–11)

## 2021-03-04 LAB
ESTIMATED AVERAGE GLUCOSE: 108.3 MG/DL
HBA1C MFR BLD: 5.4 %

## 2021-04-02 ENCOUNTER — OFFICE VISIT (OUTPATIENT)
Dept: FAMILY MEDICINE CLINIC | Age: 56
End: 2021-04-02
Payer: COMMERCIAL

## 2021-04-02 VITALS
HEART RATE: 75 BPM | TEMPERATURE: 97.2 F | OXYGEN SATURATION: 98 % | DIASTOLIC BLOOD PRESSURE: 60 MMHG | SYSTOLIC BLOOD PRESSURE: 110 MMHG | RESPIRATION RATE: 12 BRPM | WEIGHT: 167.4 LBS | BODY MASS INDEX: 28.73 KG/M2

## 2021-04-02 DIAGNOSIS — Z01.419 WELL WOMAN EXAM: Primary | ICD-10-CM

## 2021-04-02 DIAGNOSIS — E78.00 HYPERCHOLESTEROLEMIA: ICD-10-CM

## 2021-04-02 DIAGNOSIS — R91.1 PULMONARY NODULE: ICD-10-CM

## 2021-04-02 DIAGNOSIS — D45 POLYCYTHEMIA VERA (HCC): ICD-10-CM

## 2021-04-02 DIAGNOSIS — D47.3 ESSENTIAL THROMBOCYTOSIS (HCC): ICD-10-CM

## 2021-04-02 PROCEDURE — 99396 PREV VISIT EST AGE 40-64: CPT | Performed by: FAMILY MEDICINE

## 2021-04-02 NOTE — PROGRESS NOTES
Here for well checkup, physical/pap. Pt states that things are doing well, did have some bloodwork done and is here for review. Here for f/u of cholesterol. Pt as been working on diet/exercise and is consistent with therapy. No side effects from current therapy. No chest pain, shortness of breath. No numbness/tingling in extremities     Pt seeing dr. Theresa Choudhury regularly for her thrombocytosis. Pt will be seeing them in 2 weeks. Does continue with hydrea      Except as noted in the history of present illness as above, the review of systems is negative for the following:    General ROS: negative  Psychological ROS: negative  Allergy and Immunology ROS: negative  Hematological and Lymphatic ROS: negative  Respiratory ROS: no cough, shortness of breath, or wheezing  Cardiovascular ROS: no chest pain or dyspnea on exertion  Gastrointestinal ROS: no abdominal pain, change in bowel habits, or black or bloody stools  Genito-Urinary ROS: no dysuria, trouble voiding, or hematuria  Musculoskeletal ROS: negative  Dermatological ROS: negative      Past medical, surgical, and social history reviewed and updated. Medications and allergies reviewed and updated      /60   Pulse 75   Temp 97.2 °F (36.2 °C) (Temporal)   Resp 12   Wt 167 lb 6.4 oz (75.9 kg)   LMP 03/13/2019 (Approximate)   SpO2 98%   BMI 28.73 kg/m²   General appearance - healthy, alert, no distress  Skin - Skin color, texture, turgor normal. No rashes or lesions. No suspicious findings  Head - Normocephalic. No masses, lesions, tenderness or abnormalities  Eyes - conjunctivae/corneas clear. Pupils equal and reactive to light and accomodation, extraocular muscles intact. Ears - External ears normal. Canals clear. Tympanic membranes normal bilaterally. Nose/Sinuses - Nares normal. Septum midline. Mucosa normal. No drainage or sinus tenderness. Oropharynx - Lips, mucosa, and tongue normal. Teeth and gums normal.   Neck - Neck supple.  No ng/dL Final    TSH 03/03/2021 1.44  0.27 - 4.20 uIU/mL Final    Hemoglobin A1C 03/03/2021 5.4  See comment % Final    Comment: Comment:  Diagnosis of Diabetes: > or = 6.5%  Increased risk of diabetes (Prediabetes): 5.7-6.4%  Glycemic Control: Nonpregnant Adults: <7.0%                    Pregnant: <6.0%        eAG 03/03/2021 108.3  mg/dL Final    Cholesterol, Total 03/03/2021 255* 0 - 199 mg/dL Final    Triglycerides 03/03/2021 110  0 - 150 mg/dL Final    HDL 03/03/2021 57  40 - 60 mg/dL Final    LDL Calculated 03/03/2021 176* <100 mg/dL Final    VLDL Cholesterol Calculated 03/03/2021 22  Not Established mg/dL Final    Sodium 03/03/2021 141  136 - 145 mmol/L Final    Potassium 03/03/2021 4.7  3.5 - 5.1 mmol/L Final    Chloride 03/03/2021 103  99 - 110 mmol/L Final    CO2 03/03/2021 27  21 - 32 mmol/L Final    Anion Gap 03/03/2021 11  3 - 16 Final    Glucose 03/03/2021 103* 70 - 99 mg/dL Final    BUN 03/03/2021 13  7 - 20 mg/dL Final    CREATININE 03/03/2021 0.8  0.6 - 1.1 mg/dL Final    GFR Non- 03/03/2021 >60  >60 Final    Comment: >60 mL/min/1.73m2 EGFR, calc. for ages 25 and older using the  MDRD formula (not corrected for weight), is valid for stable  renal function.  GFR  03/03/2021 >60  >60 Final    Comment: Chronic Kidney Disease: less than 60 ml/min/1.73 sq.m. Kidney Failure: less than 15 ml/min/1.73 sq.m. Results valid for patients 18 years and older.       Calcium 03/03/2021 9.5  8.3 - 10.6 mg/dL Final    Total Protein 03/03/2021 6.9  6.4 - 8.2 g/dL Final    Albumin 03/03/2021 4.3  3.4 - 5.0 g/dL Final    Albumin/Globulin Ratio 03/03/2021 1.7  1.1 - 2.2 Final    Total Bilirubin 03/03/2021 0.5  0.0 - 1.0 mg/dL Final    Alkaline Phosphatase 03/03/2021 90  40 - 129 U/L Final    ALT 03/03/2021 11  10 - 40 U/L Final    AST 03/03/2021 14* 15 - 37 U/L Final    Globulin 03/03/2021 2.6  g/dL Final    WBC 03/03/2021 5.4  4.0 - 11.0 K/uL Final  RBC 03/03/2021 4.50  4.00 - 5.20 M/uL Final    Hemoglobin 03/03/2021 15.2  12.0 - 16.0 g/dL Final    Hematocrit 03/03/2021 44.4  36.0 - 48.0 % Final    MCV 03/03/2021 98.7  80.0 - 100.0 fL Final    MCH 03/03/2021 33.8  26.0 - 34.0 pg Final    MCHC 03/03/2021 34.3  31.0 - 36.0 g/dL Final    RDW 03/03/2021 15.4  12.4 - 15.4 % Final    Platelets 19/56/0813 533* 135 - 450 K/uL Final    MPV 03/03/2021 8.5  5.0 - 10.5 fL Final    PLATELET SLIDE REVIEW 03/03/2021 Adequate   Final    SLIDE REVIEW 03/03/2021 see below   Final    Slide review agrees with reported results      The 10-year ASCVD risk score (Gautam Saba, et al., 2013) is: 2.2%    Values used to calculate the score:      Age: 60 years      Sex: Female      Is Non- : No      Diabetic: No      Tobacco smoker: No      Systolic Blood Pressure: 991 mmHg      Is BP treated: No      HDL Cholesterol: 57 mg/dL      Total Cholesterol: 255 mg/dL       ASSESSMENT / PLAN:    1. Well woman exam  No focal abnormalities on exam  Anticipatory guidance discussed. Pap.pelvic exam today  - PAP SMEAR    2. Hypercholesterolemia  Mild increase in LDL from prior  ASCVD risk 2.2%  CT chest shows no calcium  Ok to monitor with diet/exercise    3. Essential thrombocytosis (HCC)  Cont f/u with heme/onc  Cont hydrea    4. Polycythemia vera (HCC)  Stable w/o sx  F/u with heme/onc    5. Pulmonary nodule  CT negative  Monitor and recheck 1 year           Follow-up appointment:   1 year/prn    Discussed use, benefit, and side effects of all prescribed medications. Barriers to medication compliance addressed. All patient questions answered. Pt voiced understanding. When applicable, patient's outside records were reviewed through [facility]. The patient has signed appropriate paperworks/consents.

## 2021-04-07 LAB
HPV COMMENT: NORMAL
HPV TYPE 16: NOT DETECTED
HPV TYPE 18: NOT DETECTED
HPVOH (OTHER TYPES): NOT DETECTED

## 2021-09-07 ENCOUNTER — OFFICE VISIT (OUTPATIENT)
Dept: FAMILY MEDICINE CLINIC | Age: 56
End: 2021-09-07
Payer: COMMERCIAL

## 2021-09-07 ENCOUNTER — OFFICE VISIT (OUTPATIENT)
Dept: DERMATOLOGY | Age: 56
End: 2021-09-07
Payer: COMMERCIAL

## 2021-09-07 ENCOUNTER — HOSPITAL ENCOUNTER (OUTPATIENT)
Dept: CT IMAGING | Age: 56
Discharge: HOME | End: 2021-09-07
Payer: COMMERCIAL

## 2021-09-07 VITALS — TEMPERATURE: 97.3 F

## 2021-09-07 VITALS
DIASTOLIC BLOOD PRESSURE: 80 MMHG | SYSTOLIC BLOOD PRESSURE: 112 MMHG | RESPIRATION RATE: 12 BRPM | HEART RATE: 63 BPM | BODY MASS INDEX: 27.94 KG/M2 | OXYGEN SATURATION: 98 % | WEIGHT: 162.8 LBS | TEMPERATURE: 98 F

## 2021-09-07 DIAGNOSIS — D22.9 MULTIPLE NEVI: ICD-10-CM

## 2021-09-07 DIAGNOSIS — Z23 NEEDS FLU SHOT: ICD-10-CM

## 2021-09-07 DIAGNOSIS — R10.84 GENERALIZED ABDOMINAL PAIN: ICD-10-CM

## 2021-09-07 DIAGNOSIS — D48.5 NEOPLASM OF UNCERTAIN BEHAVIOR OF SKIN: ICD-10-CM

## 2021-09-07 DIAGNOSIS — R19.4 CHANGE IN BOWEL HABITS: ICD-10-CM

## 2021-09-07 DIAGNOSIS — R19.4 CHANGE IN BOWEL HABITS: Primary | ICD-10-CM

## 2021-09-07 DIAGNOSIS — L82.1 SK (SEBORRHEIC KERATOSIS): Primary | ICD-10-CM

## 2021-09-07 DIAGNOSIS — L28.1 PRURIGO NODULARIS: ICD-10-CM

## 2021-09-07 DIAGNOSIS — Z23 NEED FOR SHINGLES VACCINE: ICD-10-CM

## 2021-09-07 LAB
A/G RATIO: 1.5 (ref 1.1–2.2)
ALBUMIN SERPL-MCNC: 4.3 G/DL (ref 3.4–5)
ALP BLD-CCNC: 100 U/L (ref 40–129)
ALT SERPL-CCNC: 21 U/L (ref 10–40)
ANION GAP SERPL CALCULATED.3IONS-SCNC: 11 MMOL/L (ref 3–16)
AST SERPL-CCNC: 20 U/L (ref 15–37)
BILIRUB SERPL-MCNC: 0.4 MG/DL (ref 0–1)
BUN BLDV-MCNC: 17 MG/DL (ref 7–20)
CALCIUM SERPL-MCNC: 9.9 MG/DL (ref 8.3–10.6)
CHLORIDE BLD-SCNC: 99 MMOL/L (ref 99–110)
CO2: 27 MMOL/L (ref 21–32)
CREAT SERPL-MCNC: 0.7 MG/DL (ref 0.6–1.1)
GFR AFRICAN AMERICAN: >60
GFR NON-AFRICAN AMERICAN: >60
GLOBULIN: 2.9 G/DL
GLUCOSE BLD-MCNC: 104 MG/DL (ref 70–99)
POTASSIUM SERPL-SCNC: 5.1 MMOL/L (ref 3.5–5.1)
SODIUM BLD-SCNC: 137 MMOL/L (ref 136–145)
TOTAL PROTEIN: 7.2 G/DL (ref 6.4–8.2)

## 2021-09-07 PROCEDURE — 99213 OFFICE O/P EST LOW 20 MIN: CPT | Performed by: DERMATOLOGY

## 2021-09-07 PROCEDURE — G8427 DOCREV CUR MEDS BY ELIG CLIN: HCPCS | Performed by: FAMILY MEDICINE

## 2021-09-07 PROCEDURE — G8419 CALC BMI OUT NRM PARAM NOF/U: HCPCS | Performed by: DERMATOLOGY

## 2021-09-07 PROCEDURE — 11102 TANGNTL BX SKIN SINGLE LES: CPT | Performed by: DERMATOLOGY

## 2021-09-07 PROCEDURE — G8427 DOCREV CUR MEDS BY ELIG CLIN: HCPCS | Performed by: DERMATOLOGY

## 2021-09-07 PROCEDURE — 1036F TOBACCO NON-USER: CPT | Performed by: DERMATOLOGY

## 2021-09-07 PROCEDURE — G8419 CALC BMI OUT NRM PARAM NOF/U: HCPCS | Performed by: FAMILY MEDICINE

## 2021-09-07 PROCEDURE — 74177 CT ABD & PELVIS W/CONTRAST: CPT

## 2021-09-07 PROCEDURE — 1036F TOBACCO NON-USER: CPT | Performed by: FAMILY MEDICINE

## 2021-09-07 PROCEDURE — 3017F COLORECTAL CA SCREEN DOC REV: CPT | Performed by: FAMILY MEDICINE

## 2021-09-07 PROCEDURE — 99214 OFFICE O/P EST MOD 30 MIN: CPT | Performed by: FAMILY MEDICINE

## 2021-09-07 PROCEDURE — 3017F COLORECTAL CA SCREEN DOC REV: CPT | Performed by: DERMATOLOGY

## 2021-09-07 PROCEDURE — 6360000004 HC RX CONTRAST MEDICATION: Performed by: FAMILY MEDICINE

## 2021-09-07 RX ORDER — PANTOPRAZOLE SODIUM 40 MG/1
40 TABLET, DELAYED RELEASE ORAL
Qty: 30 TABLET | Refills: 5 | Status: SHIPPED | OUTPATIENT
Start: 2021-09-07 | End: 2021-09-30

## 2021-09-07 RX ADMIN — IOPAMIDOL 80 ML: 755 INJECTION, SOLUTION INTRAVENOUS at 13:33

## 2021-09-07 SDOH — ECONOMIC STABILITY: FOOD INSECURITY: WITHIN THE PAST 12 MONTHS, THE FOOD YOU BOUGHT JUST DIDN'T LAST AND YOU DIDN'T HAVE MONEY TO GET MORE.: NEVER TRUE

## 2021-09-07 SDOH — ECONOMIC STABILITY: FOOD INSECURITY: WITHIN THE PAST 12 MONTHS, YOU WORRIED THAT YOUR FOOD WOULD RUN OUT BEFORE YOU GOT MONEY TO BUY MORE.: NEVER TRUE

## 2021-09-07 ASSESSMENT — SOCIAL DETERMINANTS OF HEALTH (SDOH): HOW HARD IS IT FOR YOU TO PAY FOR THE VERY BASICS LIKE FOOD, HOUSING, MEDICAL CARE, AND HEATING?: NOT HARD AT ALL

## 2021-09-07 NOTE — PATIENT INSTRUCTIONS
Biopsy Wound Care Instructions    · Keep the bandage in place for 24 hours. · Cleanse the wound with mild soapy water daily   Gently dry the area.  Apply Vaseline or petroleum jelly to the wound using a cotton tipped applicator.  Cover with a clean bandage.  Repeat this process until the biopsy site is healed.  If you had stitches placed, continue treating the site until the stitches are removed. Remember to make an appointment to return to have your stitches removed by our staff.  You may shower and bathe as usual.       ** Biopsy results generally take around 7 business days to come back. If you have not heard from us by then, please call the office at [phone]. *Please note that biopsy results are released to both the patient and physician at the same time in [facility]. Please allow time for your physician to review the results. One of our staff members will reach out to you with the results and plan.

## 2021-09-07 NOTE — PROGRESS NOTES
[facility]  Radha Erwin MD  [address]  1965    60 y.o. female     Date of Visit: 2021    Chief Complaint: skin lesions    History of Present Illness:    1. She complains of a lesion on the left cheek. 2.  She also complains of a chronic tender lesion on the left ear. She sleeps on her left side predominantly. 3.  She has multiple moles on the trunk and extremities-not aware of any changes in size, color, or shape. 4.  Unknown duration of an asymptomatic pink patch on the right clavicular region. Derm history:    Nodular BCC on the central forehead-treated with Mohs by Dr. Timoteo Pradhan on 2019. Superficial basal cell carcinoma on the right clavicular region-treated with curettage on 2019. Nodular BCC on the right side of the forehead-treated with Mohs by Dr. Timoteo Pradhan on 2018. Nodular BCC on the left side of the forehead-treated with Mohs by Dr. Timoteo Pradhan on 2018.     Has 2 grandchildren in [place]. Review of Systems:  Gen: Feels well, good sense of health. Past Medical History, Family History, Surgical History, Medications and Allergies reviewed.     Past Medical History:   Diagnosis Date    Anxiety     Arthritis     Depression     Gastroesophageal reflux disease without esophagitis 2017    Headache     History of renal insufficiency syndrome     Hypercholesterolemia     Iron deficiency anemia     Migraine with aura and without status migrainosus, not intractable 2016    Phyllodes tumor 2015    L breast    Polycythemia vera(238.4)     Reactive airway disease 2018    Thrombocytosis (HCC)      Past Surgical History:   Procedure Laterality Date    BREAST BIOPSY  2011    bilateral  X 5 - benign    BREAST BIOPSY Bilateral 2015    phyllodes tumor Left    BREAST BIOPSY Left 8 18 15    Needle loc    BREAST LUMPECTOMY Left 8 18 15    benign tumor removal     SECTION      X4     BCC    Discussed possible diagnosis; patient agreeable to biopsy (verbal consent obtained). The area(s) to be biopsied were marked with a surgical pen. Alcohol was used to cleanse the site. Local anesthesia was acheived with 1% lidocaine with epinephrine. Shave biopsy was performed using a razor blade. Hemostasis was achieved with aluminum chloride. The wound(s) were dressed with petrolatum and covered with a bandage. Wound care instructions were reviewed. 1 Specimen (s) sent to pathology. The specimen bottles were appropriately labeled. We also reviewed the risks of bleeding, scar, and infection. Return in about 1 year (around 9/7/2022).     --Farideh Zhang MD

## 2021-09-07 NOTE — PROGRESS NOTES
Pt states that things are doing ok despite COVID, did get vaccination and did tolerate well. Pt did have recent URI sx and did get tested for COVID but was negative. Managing the stress ok    Pt has noted some issues with stomach concerns, present for a few weeks to months, that seems to notice in the past 6 months. Pt started with fiber to help with constipation. Pt states that she was on vacation 3 weeks ago, and while there did get some loose bowels, and then transitioned to constipation. Pt wonders if there was foods that were triggering symptoms. Pt has started to track foods but has not seen any consistent trigger. Pt has cut down on EtOH significantly but when drinks doesn't bother. Pt now feels some fullness and when going, doesn't feel productive. No nausea/vomiting. No blood in bowels. Bowels can be irregular, and at times can be thinner, and not emptying well. No BRBPR. Pt has been losing weight, felt initially on purpse but has continued. Pt did have colonoscopy about 7/2015 and that was normal, and was told to come in 10 years. No sx of regularl dysphagia, but inconsistent. Pt does take PPI qAM with prilosec 20mg qd and feels some mild GERD sx only if drinking alcohol. Except as noted above in the history of present illness, the review of systems is  negative for headache, vision changes, chest pain, shortness of breath,urinary sx    Past medical, surgical, and social history reviewed and updated  Medications and allergies reviewed and updated      O: /80   Pulse 63   Temp 98 °F (36.7 °C) (Temporal)   Resp 12   Wt 162 lb 12.8 oz (73.8 kg)   LMP 03/13/2019 (Approximate)   SpO2 98%   BMI 27.94 kg/m²   GEN: No acute distress, cooperative, well nourished, alert. HEENT: PEERLA, EOMI , normocephalic/atraumatic, nares and oropharynx clear. Mucous membranes normal, Tympanic membranes clear bilaterally.   Neck: soft, supple, no thyromegaly, mass, no Lymphadenopathy  CV:

## 2021-09-09 LAB — DERMATOLOGY PATHOLOGY REPORT: ABNORMAL

## 2021-09-24 ENCOUNTER — PROCEDURE VISIT (OUTPATIENT)
Dept: DERMATOLOGY | Age: 56
End: 2021-09-24
Payer: COMMERCIAL

## 2021-09-24 VITALS — TEMPERATURE: 97.6 F

## 2021-09-24 DIAGNOSIS — C44.519 BASAL CELL CARCINOMA (BCC) OF CLAVICULAR AREA: Primary | ICD-10-CM

## 2021-09-24 PROCEDURE — 17262 DSTRJ MAL LES T/A/L 1.1-2.0: CPT | Performed by: DERMATOLOGY

## 2021-09-24 NOTE — PROGRESS NOTES
[facility]  Richa Hackett MD  [phone]      Anita Mckeon  1965    60 y.o. female     Date of Visit: 2021    Chief Complaint: [facility]    History of Present Illness:    Here today for treatment of a superficial BCC on the right clavicular region. RESULTS   DIAGNOSIS   DIAGNOSIS:     Right clavicular-     Basal Cell Carcinoma, Superficial Type       RESULTS Timothy Jackson MD   Electronic Signature: 09 SEP 2021 10:54 AM       Review of Systems:  Gen: Feels well, good sense of health. Past Medical History, Family History, Surgical History, Medications and Allergies reviewed. Past Medical History:   Diagnosis Date    Anxiety     Arthritis     Depression     Gastroesophageal reflux disease without esophagitis 2017    Headache     History of renal insufficiency syndrome     Hypercholesterolemia     Iron deficiency anemia     Migraine with aura and without status migrainosus, not intractable 2016    Phyllodes tumor 2015    L breast    Polycythemia vera(238.4)     Reactive airway disease 2018    Thrombocytosis (HCC)      Past Surgical History:   Procedure Laterality Date    BREAST BIOPSY  2011    bilateral  X 5 - benign    BREAST BIOPSY Bilateral 2015    phyllodes tumor Left    BREAST BIOPSY Left 8 18 15    Needle loc    BREAST LUMPECTOMY Left 8 18 15    benign tumor removal     SECTION      X4     KNEE ARTHROSCOPY      right    MANDIBLE SURGERY      X2       No Known Allergies  No outpatient medications have been marked as taking for the 21 encounter (Appointment) with Ander Leal MD.           Physical Examination       Well appearing. 1.  R clavicular region with a 1.3 cm pink patch. Assessment and Plan     1. Superficial basal cell carcinoma (BCC) of right clavicular area - 1.3 cm    The area to be treated with cleansed with alcohol and marked with surgical marking pen.  Local

## 2021-09-30 RX ORDER — PANTOPRAZOLE SODIUM 40 MG/1
TABLET, DELAYED RELEASE ORAL
Qty: 30 TABLET | Refills: 5 | Status: SHIPPED | OUTPATIENT
Start: 2021-09-30 | End: 2022-04-07 | Stop reason: SDUPTHER

## 2021-09-30 NOTE — TELEPHONE ENCOUNTER
Requested Prescriptions     Pending Prescriptions Disp Refills    pantoprazole (PROTONIX) 40 MG tablet [Pharmacy Med Name: PANTOPRAZOLE SOD DR 40 MG TAB] 30 tablet 5     Sig: TAKE 1 TABLET BY MOUTH EVERY DAY BEFORE BREAKFAST     Last ov 9/7/2021  Last labs 9/7/21

## 2022-02-11 ENCOUNTER — OFFICE VISIT (OUTPATIENT)
Dept: FAMILY MEDICINE CLINIC | Age: 57
End: 2022-02-11
Payer: COMMERCIAL

## 2022-02-11 VITALS
BODY MASS INDEX: 28.8 KG/M2 | SYSTOLIC BLOOD PRESSURE: 124 MMHG | OXYGEN SATURATION: 97 % | RESPIRATION RATE: 14 BRPM | DIASTOLIC BLOOD PRESSURE: 70 MMHG | WEIGHT: 167.8 LBS | TEMPERATURE: 97.8 F | HEART RATE: 71 BPM

## 2022-02-11 DIAGNOSIS — M79.662 PAIN OF LEFT CALF: ICD-10-CM

## 2022-02-11 DIAGNOSIS — M25.522 LEFT ELBOW PAIN: ICD-10-CM

## 2022-02-11 DIAGNOSIS — R06.02 SOB (SHORTNESS OF BREATH): ICD-10-CM

## 2022-02-11 DIAGNOSIS — D47.3 ESSENTIAL THROMBOCYTOSIS (HCC): ICD-10-CM

## 2022-02-11 DIAGNOSIS — D45 POLYCYTHEMIA VERA (HCC): Primary | ICD-10-CM

## 2022-02-11 LAB
A/G RATIO: 1.7 (ref 1.1–2.2)
ALBUMIN SERPL-MCNC: 4.5 G/DL (ref 3.4–5)
ALP BLD-CCNC: 78 U/L (ref 40–129)
ALT SERPL-CCNC: 13 U/L (ref 10–40)
ANION GAP SERPL CALCULATED.3IONS-SCNC: 12 MMOL/L (ref 3–16)
AST SERPL-CCNC: 17 U/L (ref 15–37)
BASOPHILS ABSOLUTE: 0.1 K/UL (ref 0–0.2)
BASOPHILS RELATIVE PERCENT: 0.7 %
BILIRUB SERPL-MCNC: 0.5 MG/DL (ref 0–1)
BUN BLDV-MCNC: 17 MG/DL (ref 7–20)
CALCIUM SERPL-MCNC: 9.5 MG/DL (ref 8.3–10.6)
CHLORIDE BLD-SCNC: 100 MMOL/L (ref 99–110)
CO2: 27 MMOL/L (ref 21–32)
CREAT SERPL-MCNC: 0.7 MG/DL (ref 0.6–1.1)
D DIMER: <200 NG/ML DDU (ref 0–229)
EOSINOPHILS ABSOLUTE: 0.2 K/UL (ref 0–0.6)
EOSINOPHILS RELATIVE PERCENT: 2.1 %
GFR AFRICAN AMERICAN: >60
GFR NON-AFRICAN AMERICAN: >60
GLUCOSE BLD-MCNC: 105 MG/DL (ref 70–99)
HCT VFR BLD CALC: 44.1 % (ref 36–48)
HEMOGLOBIN: 15 G/DL (ref 12–16)
LYMPHOCYTES ABSOLUTE: 1.9 K/UL (ref 1–5.1)
LYMPHOCYTES RELATIVE PERCENT: 25.6 %
MCH RBC QN AUTO: 32.7 PG (ref 26–34)
MCHC RBC AUTO-ENTMCNC: 34 G/DL (ref 31–36)
MCV RBC AUTO: 96.4 FL (ref 80–100)
MONOCYTES ABSOLUTE: 0.6 K/UL (ref 0–1.3)
MONOCYTES RELATIVE PERCENT: 7.6 %
NEUTROPHILS ABSOLUTE: 4.7 K/UL (ref 1.7–7.7)
NEUTROPHILS RELATIVE PERCENT: 64 %
PDW BLD-RTO: 15.6 % (ref 12.4–15.4)
PLATELET # BLD: 671 K/UL (ref 135–450)
PMV BLD AUTO: 8.1 FL (ref 5–10.5)
POTASSIUM SERPL-SCNC: 4.4 MMOL/L (ref 3.5–5.1)
RBC # BLD: 4.57 M/UL (ref 4–5.2)
SODIUM BLD-SCNC: 139 MMOL/L (ref 136–145)
TOTAL PROTEIN: 7.1 G/DL (ref 6.4–8.2)
WBC # BLD: 7.3 K/UL (ref 4–11)

## 2022-02-11 PROCEDURE — G8427 DOCREV CUR MEDS BY ELIG CLIN: HCPCS | Performed by: FAMILY MEDICINE

## 2022-02-11 PROCEDURE — 1036F TOBACCO NON-USER: CPT | Performed by: FAMILY MEDICINE

## 2022-02-11 PROCEDURE — G8484 FLU IMMUNIZE NO ADMIN: HCPCS | Performed by: FAMILY MEDICINE

## 2022-02-11 PROCEDURE — 93000 ELECTROCARDIOGRAM COMPLETE: CPT | Performed by: FAMILY MEDICINE

## 2022-02-11 PROCEDURE — 3017F COLORECTAL CA SCREEN DOC REV: CPT | Performed by: FAMILY MEDICINE

## 2022-02-11 PROCEDURE — G8419 CALC BMI OUT NRM PARAM NOF/U: HCPCS | Performed by: FAMILY MEDICINE

## 2022-02-11 PROCEDURE — 99214 OFFICE O/P EST MOD 30 MIN: CPT | Performed by: FAMILY MEDICINE

## 2022-02-11 NOTE — PROGRESS NOTES
Here for eval of some pain in wrist and leg. Pt states that she has been doing well, staying healthy. Pt staying active but noticed some numbness sensation to L elbow/forearm. Pt sttaes that sx come and go and seems to feel that It occurred related to shoveling. Pt now with some discomfort to her blood vessel in LUE  Sx for 1-2 weeks. Pt states she has noted some intermittent shortness of breath, at beginning of walking but then gets better but feels some pressure in chest after being done with walking. Today, pt has noted some pain in L post calf, in vein in the back of that leg. Does have varicose vein in the area and it is painful when she moves leg. Does have discomfort with walking. No pain in calf with flexing foot. The leg sx started this AM    Pt does follow closley with heme/onc for elevation of platelets. Pt does continue on hydrea 4x per week and platelets have been stable. Last visit in lab was 7/2021 and platelets were ok. Except as noted above in the history of present illness, the review of systems is  negative for headache, vision changes, chest pain, shortness of breath, abdominal pain, urinary sx, bowel changes. Past medical, surgical, and social history reviewed and updated  Medications and allergies reviewed and updated        O: /70   Pulse 71   Temp 97.8 °F (36.6 °C)   Resp 14   Wt 167 lb 12.8 oz (76.1 kg)   LMP 03/13/2019 (Approximate)   SpO2 97%   BMI 28.80 kg/m²   GEN: No acute distress, cooperative, well nourished, alert. HEENT: PEERLA, EOMI , normocephalic/atraumatic, nares and oropharynx clear. Mucous membranes normal, Tympanic membranes clear bilaterally. Neck: soft, supple, no thyromegaly, mass, no Lymphadenopathy  CV: Regular rate and rhythm, no murmur, rubs, gallops. No edema. Resp: Clear to auscultation bilaterally good air entry bilaterally  No crackles, wheeze. Breathing comfortably.    Psych: mood stable, No suicidal thoughts or ideation Ext/musc: LUE with mild to moderate tender to palpation to R medial epicondyle. Strength 5/5 . Neurovascularly intact   LLE with full range of motion, neg straight leg-raise biaterally. No calf pain, no palpable mass, neg homans        Current Outpatient Medications   Medication Sig Dispense Refill    pantoprazole (PROTONIX) 40 MG tablet TAKE 1 TABLET BY MOUTH EVERY DAY BEFORE BREAKFAST 30 tablet 5    Multiple Vitamin (MULTI-VITAMIN DAILY PO) Take by mouth      hydroxyurea (HYDREA) 500 MG chemo capsule TAKE ONE CAPSULE BY MOUTH DAILY OR AS DIRECTED BASED ON BLOOD WORK 30 capsule 5    ferrous sulfate 325 (65 FE) MG tablet Take 325 mg by mouth daily (with breakfast).  b complex vitamins capsule Take 1 capsule by mouth daily  (Patient not taking: Reported on 2/11/2022)       No current facility-administered medications for this visit. ASSESSMENT / PLAN:    1. Polycythemia vera ([address]  Cont hydrea  Cont f/u with heme/onc    2. Essential thrombocytosis (HCC)  Stable, f/u with heme/onc  Cont supportive therapy    3. Left elbow pain  C/w medial epicondylitis  Monitor with NSAIDs, range of motion exercises    4. Pain of left calf  Muscular  No s/s of DVT  Check bloodwork for DDimer  - CBC Auto Differential; Future  - D-DIMER, QUANTITATIVE; Future  - Comprehensive Metabolic Panel; Future    5. SOB (shortness of breath)  EKG nromal  Exam nonfocal  Check ddimer  Management pending results. - EKG 12 Lead           Follow-up appointment:   Pending bloodwork results    Discussed use, benefit, and side effects of all prescribed medications. Barriers to medication compliance addressed. All patient questions answered. Pt voiced understanding. When applicable, patient's outside records were [facility]. The patient has signed appropriate paperworks/consents.

## 2022-02-17 ENCOUNTER — HOSPITAL ENCOUNTER (OUTPATIENT)
Dept: MAMMOGRAPHY | Age: 57
Discharge: HOME | End: 2022-02-17
Payer: COMMERCIAL

## 2022-02-17 VITALS — BODY MASS INDEX: 28.17 KG/M2 | WEIGHT: 165 LBS | HEIGHT: 64 IN

## 2022-02-17 DIAGNOSIS — Z12.31 VISIT FOR SCREENING MAMMOGRAM: ICD-10-CM

## 2022-02-17 PROCEDURE — 77063 BREAST TOMOSYNTHESIS BI: CPT

## 2022-04-07 ENCOUNTER — TELEPHONE (OUTPATIENT)
Dept: FAMILY MEDICINE CLINIC | Age: 57
End: 2022-04-07

## 2022-04-07 RX ORDER — PANTOPRAZOLE SODIUM 40 MG/1
TABLET, DELAYED RELEASE ORAL
Qty: 30 TABLET | Refills: 5 | Status: SHIPPED | OUTPATIENT
Start: 2022-04-07 | End: 2022-09-30

## 2022-04-07 NOTE — TELEPHONE ENCOUNTER
Requested Prescriptions     Pending Prescriptions Disp Refills    pantoprazole (PROTONIX) 40 MG tablet 30 tablet 5     Sig: TAKE 1 TABLET BY MOUTH EVERY DAY BEFORE BREAKFAST     Last ov 2/11/2022  Last labs 2/11/22

## 2022-04-07 NOTE — TELEPHONE ENCOUNTER
Patient requesting refill to be sent to pharmacy on file.  Thanks   [facility] [phone]      pantoprazole (PROTONIX) 40 MG tablet [[id number]]     Order Details  Dose, Route, Frequency: As Directed  Dispense Quantity: 30 tablet Refills: 5        Sig: TAKE 1 TABLET BY MOUTH EVERY DAY BEFORE BREAKFAST       Start Date: 09/30/21 End Date: --  Written Date: 09/30/21 Expiration Date: 09/30/22  Original Order:  pantoprazole (PROTONIX) 40 MG tablet [[id number]]    Providers    Authorizing Provider: Ellen Dutton MD NPI: [id number]  Ordering User:  Ellen Dutton MD

## 2022-08-12 ENCOUNTER — TELEPHONE (OUTPATIENT)
Dept: FAMILY MEDICINE CLINIC | Age: 57
End: 2022-08-12

## 2022-08-12 NOTE — TELEPHONE ENCOUNTER
----- Message from Bob Haque sent at 8/12/2022  1:22 PM EDT -----  Subject: Referral Request    Reason for referral request? flu shot @ next appt  Provider patient wants to be referred to(if known):     Provider Phone Number(if known):     Additional Information for Provider?   ---------------------------------------------------------------------------  --------------  [id number] [employer]    [phone]; OK to leave message on voicemail  ---------------------------------------------------------------------------  --------------

## 2022-08-24 ENCOUNTER — OFFICE VISIT (OUTPATIENT)
Dept: FAMILY MEDICINE CLINIC | Age: 57
End: 2022-08-24
Payer: COMMERCIAL

## 2022-08-24 VITALS
WEIGHT: 168.6 LBS | TEMPERATURE: 97.1 F | DIASTOLIC BLOOD PRESSURE: 64 MMHG | HEART RATE: 73 BPM | RESPIRATION RATE: 14 BRPM | OXYGEN SATURATION: 97 % | SYSTOLIC BLOOD PRESSURE: 118 MMHG | BODY MASS INDEX: 28.94 KG/M2

## 2022-08-24 DIAGNOSIS — M25.50 ARTHRALGIA OF MULTIPLE JOINTS: ICD-10-CM

## 2022-08-24 DIAGNOSIS — E78.00 HYPERCHOLESTEROLEMIA: ICD-10-CM

## 2022-08-24 DIAGNOSIS — K21.9 GASTROESOPHAGEAL REFLUX DISEASE WITHOUT ESOPHAGITIS: Primary | ICD-10-CM

## 2022-08-24 DIAGNOSIS — R13.19 INTERMITTENT DYSPHAGIA: ICD-10-CM

## 2022-08-24 DIAGNOSIS — D47.3 ESSENTIAL THROMBOCYTOSIS (HCC): ICD-10-CM

## 2022-08-24 LAB
A/G RATIO: 2.2 (ref 1.1–2.2)
ALBUMIN SERPL-MCNC: 4.7 G/DL (ref 3.4–5)
ALP BLD-CCNC: 84 U/L (ref 40–129)
ALT SERPL-CCNC: 13 U/L (ref 10–40)
ANION GAP SERPL CALCULATED.3IONS-SCNC: 11 MMOL/L (ref 3–16)
AST SERPL-CCNC: 17 U/L (ref 15–37)
BASOPHILS ABSOLUTE: 0.1 K/UL (ref 0–0.2)
BASOPHILS RELATIVE PERCENT: 1.1 %
BILIRUB SERPL-MCNC: 0.5 MG/DL (ref 0–1)
BUN BLDV-MCNC: 18 MG/DL (ref 7–20)
C-REACTIVE PROTEIN: <3 MG/L (ref 0–5.1)
CALCIUM SERPL-MCNC: 9.8 MG/DL (ref 8.3–10.6)
CHLORIDE BLD-SCNC: 102 MMOL/L (ref 99–110)
CO2: 28 MMOL/L (ref 21–32)
CREAT SERPL-MCNC: 0.7 MG/DL (ref 0.6–1.1)
EOSINOPHILS ABSOLUTE: 0.3 K/UL (ref 0–0.6)
EOSINOPHILS RELATIVE PERCENT: 4.9 %
GFR AFRICAN AMERICAN: >60
GFR NON-AFRICAN AMERICAN: >60
GLUCOSE BLD-MCNC: 97 MG/DL (ref 70–99)
HCT VFR BLD CALC: 45.3 % (ref 36–48)
HEMOGLOBIN: 15 G/DL (ref 12–16)
LYMPHOCYTES ABSOLUTE: 1.8 K/UL (ref 1–5.1)
LYMPHOCYTES RELATIVE PERCENT: 26.3 %
MCH RBC QN AUTO: 32.4 PG (ref 26–34)
MCHC RBC AUTO-ENTMCNC: 33.2 G/DL (ref 31–36)
MCV RBC AUTO: 97.6 FL (ref 80–100)
MONOCYTES ABSOLUTE: 0.6 K/UL (ref 0–1.3)
MONOCYTES RELATIVE PERCENT: 8.5 %
NEUTROPHILS ABSOLUTE: 4.1 K/UL (ref 1.7–7.7)
NEUTROPHILS RELATIVE PERCENT: 59.2 %
PDW BLD-RTO: 15.9 % (ref 12.4–15.4)
PLATELET # BLD: 679 K/UL (ref 135–450)
PMV BLD AUTO: 8.2 FL (ref 5–10.5)
POTASSIUM SERPL-SCNC: 5.5 MMOL/L (ref 3.5–5.1)
RBC # BLD: 4.64 M/UL (ref 4–5.2)
RHEUMATOID FACTOR: <10 IU/ML
SEDIMENTATION RATE, ERYTHROCYTE: 18 MM/HR (ref 0–30)
SODIUM BLD-SCNC: 141 MMOL/L (ref 136–145)
TOTAL PROTEIN: 6.8 G/DL (ref 6.4–8.2)
WBC # BLD: 7 K/UL (ref 4–11)

## 2022-08-24 PROCEDURE — G8419 CALC BMI OUT NRM PARAM NOF/U: HCPCS | Performed by: FAMILY MEDICINE

## 2022-08-24 PROCEDURE — 1036F TOBACCO NON-USER: CPT | Performed by: FAMILY MEDICINE

## 2022-08-24 PROCEDURE — 3017F COLORECTAL CA SCREEN DOC REV: CPT | Performed by: FAMILY MEDICINE

## 2022-08-24 PROCEDURE — 99214 OFFICE O/P EST MOD 30 MIN: CPT | Performed by: FAMILY MEDICINE

## 2022-08-24 PROCEDURE — G8427 DOCREV CUR MEDS BY ELIG CLIN: HCPCS | Performed by: FAMILY MEDICINE

## 2022-08-24 RX ORDER — MELOXICAM 15 MG/1
15 TABLET ORAL DAILY
Qty: 30 TABLET | Refills: 3 | Status: SHIPPED | OUTPATIENT
Start: 2022-08-24 | End: 2022-09-23

## 2022-08-24 ASSESSMENT — PATIENT HEALTH QUESTIONNAIRE - PHQ9
SUM OF ALL RESPONSES TO PHQ QUESTIONS 1-9: 0
SUM OF ALL RESPONSES TO PHQ9 QUESTIONS 1 & 2: 0
SUM OF ALL RESPONSES TO PHQ QUESTIONS 1-9: 0
SUM OF ALL RESPONSES TO PHQ QUESTIONS 1-9: 0
1. LITTLE INTEREST OR PLEASURE IN DOING THINGS: 0
2. FEELING DOWN, DEPRESSED OR HOPELESS: 0
SUM OF ALL RESPONSES TO PHQ QUESTIONS 1-9: 0

## 2022-08-24 NOTE — PROGRESS NOTES
Here for f/u and recheck of polycythemia, anxetiy, and some joint pain issues. Pt states she has noted some joint discomfort that has started to be more prominent. Pt with sx in multiple joints, hands, wrists and elbows, as well as knees, toes. Pt states that sx have increased in the past 6 months. Pt is able to be active, and do her normal activities but does feel at times that she is limited. Does feel swelling. Pt has tried over the counter CBD that does help. Does use prn NSAIDS with help but that does flare her GI issues    Pt does feel strength is ok overall. No issues of weakness and no significant numbness/tingling. Pt does have family hx of osteoarthritis. Pt states stomach sx are doing ok, does monitor with dietary modification and protonix that does seem to do well for her. Pt does get some intermittent dysphagia, once every 7-10d. Pt working with heme/onc for her PV, and was working with dr. Cara Rodrigez but is transitioning over to new physician as he left [facility]. Will see them on 9/6. Pt taking hydrea 4x/wk        Except as noted above in the history of present illness, the review of systems is  negative for headache, vision changes, chest pain, shortness of breath, abdominal pain, urinary sx, bowel changes. Past medical, surgical, and social history reviewed and updated  Medications and allergies reviewed and updated        O: /64   Pulse 73   Temp 97.1 °F (36.2 °C) (Temporal)   Resp 14   Wt 168 lb 9.6 oz (76.5 kg)   LMP 03/13/2019 (Approximate)   SpO2 97%   BMI 28.94 kg/m²   GEN: No acute distress, cooperative, well nourished, alert. HEENT: PEERLA, EOMI , normocephalic/atraumatic, nares and oropharynx clear. Mucous membranes normal, Tympanic membranes clear bilaterally. Neck: soft, supple, no thyromegaly, mass, no Lymphadenopathy  CV: Regular rate and rhythm, no murmur, rubs, gallops. No edema.   Resp: Clear to auscultation bilaterally good air entry bilaterally  No crackles, wheeze. Breathing comfortably. Psych: mood stable, No suicidal thoughts or ideation   Musc: full range of motion bilateral upper extremities,bilateral lower extremities. No visible synovitis. Mild diffuse tender to palpation to joints in hands, elbow, shoulders, ankles, toes        ASSESSMENT / PLAN:    1. Gastroesophageal reflux disease without esophagitis  Stable w/ protonix  Cont lifestyle mgt  Check swallow study d/t intermittent dysphagia sx    2. Arthralgia of multiple joints  Exam nonfocal  Check bloodwork to rule out RA/CTD  Trial mobic 15mg qd  ? If sx relate to use of hydrea, which causes arthralgias in up to 9% of pts  - CBC with Auto Differential; Future  - Comprehensive Metabolic Panel; Future  - Sedimentation Rate; Future  - C-Reactive Protein; Future  - NAVI Reflex to Antibody Cascade; Future  - Rheumatoid Factor; Future  - Cyclic Citrul Peptide Antibody, IgG; Future    3. Intermittent dysphagia  - FL ESOPHAGRAM; Future    4. Hypercholesterolemia  Check bloodwork at f/u appt  Hold today d/t lack of fasting    5. Essential thrombocytosis (HCC)  F/u with heme/onc  Cont hydrea  Check bloodwork   - CBC with Auto Differential; Future           Follow-up appointment:   Pending bloodwork results/prn     Discussed use, benefit, and side effects of all prescribed medications. Barriers to medication compliance addressed. All patient questions answered. Pt voiced understanding. When applicable, patient's outside records were reviewed through [place]. The patient has signed appropriate paperworks/consents.

## 2022-08-25 LAB
ANTI-NUCLEAR ANTIBODY (ANA): NEGATIVE
CYCLIC CITRULLINATED PEPTIDE ANTIBODY IGG: 1.3 U/ML (ref 0–2.9)

## 2022-09-07 ENCOUNTER — OFFICE VISIT (OUTPATIENT)
Dept: DERMATOLOGY | Age: 57
End: 2022-09-07
Payer: COMMERCIAL

## 2022-09-07 DIAGNOSIS — L72.0 EPIDERMOID CYST: ICD-10-CM

## 2022-09-07 DIAGNOSIS — D48.5 NEOPLASM OF UNCERTAIN BEHAVIOR OF SKIN: Primary | ICD-10-CM

## 2022-09-07 DIAGNOSIS — L57.0 AK (ACTINIC KERATOSIS): ICD-10-CM

## 2022-09-07 PROCEDURE — 17000 DESTRUCT PREMALG LESION: CPT | Performed by: DERMATOLOGY

## 2022-09-07 PROCEDURE — 1036F TOBACCO NON-USER: CPT | Performed by: DERMATOLOGY

## 2022-09-07 PROCEDURE — 17003 DESTRUCT PREMALG LES 2-14: CPT | Performed by: DERMATOLOGY

## 2022-09-07 PROCEDURE — 11102 TANGNTL BX SKIN SINGLE LES: CPT | Performed by: DERMATOLOGY

## 2022-09-07 PROCEDURE — 3017F COLORECTAL CA SCREEN DOC REV: CPT | Performed by: DERMATOLOGY

## 2022-09-07 PROCEDURE — G8419 CALC BMI OUT NRM PARAM NOF/U: HCPCS | Performed by: DERMATOLOGY

## 2022-09-07 PROCEDURE — G8427 DOCREV CUR MEDS BY ELIG CLIN: HCPCS | Performed by: DERMATOLOGY

## 2022-09-07 PROCEDURE — 99212 OFFICE O/P EST SF 10 MIN: CPT | Performed by: DERMATOLOGY

## 2022-09-07 NOTE — PROGRESS NOTES
X2       No Known Allergies  Outpatient Medications Marked as Taking for the 9/7/22 encounter (Office Visit) with Rey Shannon MD   Medication Sig Dispense Refill    meloxicam (MOBIC) 15 MG tablet Take 1 tablet by mouth daily 30 tablet 3    pantoprazole (PROTONIX) 40 MG tablet TAKE 1 TABLET BY MOUTH EVERY DAY BEFORE BREAKFAST 30 tablet 5    Multiple Vitamin (MULTI-VITAMIN DAILY PO) Take by mouth      hydroxyurea (HYDREA) 500 MG chemo capsule TAKE ONE CAPSULE BY MOUTH DAILY OR AS DIRECTED BASED ON BLOOD WORK 30 capsule 5    b complex vitamins capsule Take 1 capsule by mouth daily      ferrous sulfate 325 (65 FE) MG tablet Take 325 mg by mouth daily (with breakfast). Physical Examination       The following were examined and determined to be normal: Psych/Neuro, Scalp/hair, Conjunctivae/eyelids, Gums/teeth/lips, Neck, Breast/axilla/chest, Abdomen, RUE, LUE, RLE, LLE, and Nails/digits. The following were examined and determined to be abnormal: Head/face and Back. Well-appearing. 1.  Right upper forehead with a 6 mm round pink macule with several short fine telangiectasias. 2.  Right proximal nasal dorsum and right upper back with 2 hyperkeratotic erythematous macules. 3.  Left upper back with 2 adjacent round smooth skin subcutaneous skin colored nodules. Assessment and Plan     1. Neoplasm of uncertain behavior of skin, right upper forehead - r/o BCC    Discussed possible diagnosis; patient agreeable to proceed with biopsy (written consent obtained). Risks of the procedure were reviewed including discomfort, bleeding, scar and infection. The area(s) to be biopsied were marked with a surgical pen. Alcohol was used to cleanse the site. Local anesthesia was acheived with 1% lidocaine with epinephrine. Shave biopsy was performed using a razor blade. Hemostasis was achieved with aluminum chloride. The wound(s) were dressed with petrolatum and covered with a bandage.   Wound care instructions were reviewed. 1 Specimen (s) sent to pathology. The specimen bottles were appropriately labeled. The patient tolerated the procedure well and there were no immediate complications. 2. AK (actinic keratosis) - 2    Cryotherapy was discussed and patient agreed to proceed. Consent was obtained. 2 lesions were treated cryotherapy: right nasal dorsum - 1, right upper back - 1. 2 cycles of liquid nitrogen applied to each lesion for 5 seconds using a Cry-Ac cryo spray gun. Patient was educated regarding the potential risks of blister formation and discomfort. Wound care was discussed. The patient tolerated the procedure well and there were no immediate complications. 3. Epidermoid cysts - enlarging and symptomatic    Return for excision. Return in about 6 months (around 3/7/2023).     --Kathryn Alejo MD

## 2022-09-07 NOTE — PATIENT INSTRUCTIONS

## 2022-09-12 DIAGNOSIS — C44.319 BASAL CELL CARCINOMA (BCC) OF RIGHT FOREHEAD: Primary | ICD-10-CM

## 2022-09-23 ENCOUNTER — PROCEDURE VISIT (OUTPATIENT)
Dept: DERMATOLOGY | Age: 57
End: 2022-09-23
Payer: COMMERCIAL

## 2022-09-23 DIAGNOSIS — R20.9 DISTURBANCE OF SKIN SENSATION: ICD-10-CM

## 2022-09-23 DIAGNOSIS — L72.0 EPIDERMOID CYST: Primary | ICD-10-CM

## 2022-09-23 PROCEDURE — 11402 EXC TR-EXT B9+MARG 1.1-2 CM: CPT | Performed by: DERMATOLOGY

## 2022-09-27 ENCOUNTER — PROCEDURE VISIT (OUTPATIENT)
Dept: SURGERY | Age: 57
End: 2022-09-27
Payer: COMMERCIAL

## 2022-09-27 DIAGNOSIS — C44.319 BASAL CELL CARCINOMA OF FOREHEAD: Primary | ICD-10-CM

## 2022-09-27 PROCEDURE — 17312 MOHS ADDL STAGE: CPT | Performed by: DERMATOLOGY

## 2022-09-27 PROCEDURE — 17311 MOHS 1 STAGE H/N/HF/G: CPT | Performed by: DERMATOLOGY

## 2022-09-27 PROCEDURE — 12052 INTMD RPR FACE/MM 2.6-5.0 CM: CPT | Performed by: DERMATOLOGY

## 2022-09-27 NOTE — PROGRESS NOTES
carefully defined and debulked, determining the edge of the surgical excision. A thin layer of tumor-laden tissue was excised with a narrow margin of normal-appearing skin, using the technique of Mohs. A map was prepared to correspond to the area of skin from which it was excised. Hemostasis was achieved using electrosurgery. The wound was bandaged. The tissue was prepared for the cryostat and sectioned. 1 section(s) prepared. Each section was coded, cut, and stained for microscopic examination. The entire base and margins of the excised piece of tissue were examined by the surgeon. The tissue was examined to the level of subcutaneous fat. Stage I:  Nodular BCC: large basaloid lobules of varying shape and size with peripheral palisading present around the rim of the lobule, with retraction of the tumor lobules from their associated stroma in the dermis. The remaining tumor was noted and the next stage was performed. Stage II:  A thin layer of tissue was removed at the histologically-identified sites of remaining tumor. The entire procedure as described in stage I was repeated to process the tissue according to Mohs technique. 1 section(s) prepared for stage II. No tumor was identified at the peripheral margins of stage II of microscopically controlled surgery. DEFECT MANAGEMENT:    REPAIR DESCRIPTION:  Various closure modalities were discussed with the patient, and it was decided that an intermediate layered repair would best preserve normal anatomic and functional relationships. Additional risk of wound dehiscence was discussed. The area was anesthetized with 1% lidocaine with epinephrine 1:100,000 buffered, was given a sterile prep using Chlorhexidine gluconate 4% solution and draped in the usual sterile fashion. Recreation and enlargement of the wound was performed by excising cones of tissue via the triangulation technique.     The final incision lines were placed with respect for the patient's natural skin tension lines in a linear configuration to avoid functional and aesthetic distortion of adjacent free margins. Following minimal undermining, meticulous hemostasis was obtained with spot monopolar electrocoagulation. Subcutaneous dead space and dermis were closed using 5-0 Vicryl buried subcutaneous interrupted suture and the epidermis was approximated with 5-0 Fast absorbing gut running epidermal sutures. WOUND COVERAGE:  The wound was cleaned with normal saline solution, dried off, Aquaphor ointment was applied, and the wound was covered. A dressing was applied for stabilization and light pressure. The patient was given detailed oral and written instructions on postoperative care. There were no complications. The patient left the Unit in good medical condition. FOLLOW-UP:  As dissolving sutures were placed, the patient was asked to return if any questions or concerns arose, but otherwise will return to see general dermatology per their instructions.

## 2022-09-27 NOTE — PROGRESS NOTES
PRE-PROCEDURE SCREENING    Pacemaker/ICD: No  Difficulty with numbing in the past: No  Local Anesthesia Reaction/passing out: No  Latex or adhesive allergy:  No  Bleeding/Clotting Disorders: Yes, Thrombocytopenia, takes Hydroxyurea  Anticoagulant Therapy: Yes  Joint prosthesis: No  Artificial Heart Valve: No  Stroke or Seizures: No  Organ Transplant or Lymphoma: No  Immunosuppression: Yes  Respiratory Problems: No

## 2022-09-27 NOTE — PATIENT INSTRUCTIONS
[facility] Office Hours:    Monday-Thursday  7:30 AM-4:30 PM    Friday  9:00 AM-1:00 PM    POST-OPERATIVE CARE FOR DISSOLVING STICHES  Bandage change after 48 hours    During your procedure today, dissolving sutures, or stiches, were used to close the wound. You do not have to have stiches removed. They will dissolve (melt away) on their own. Please follow these instructions to help you recover from your procedure and help your wound heal.    CARING FOR YOUR SURGICAL SITE  The bandage should remain on and completely dry for 48 hours. Do NOT get the bandage wet. After the first 48 hours, gently remove the remaining part of the bandage. It can be helpful to moisten the bandage edges in the shower. Steri strips may still be on the wound. It is ok, they will fall off slowly with the daily bandage changes. Gently clean on and around the wound daily with mild soap and water. Some water is okay, but remember the more water on them, the quicker they dissolve! Dry (pat) the area with a clean Q-tip or gauze. Try to clean off any debris or crust from the area. Apply a layer of Vaseline/ Aquaphor (or Bacitracin if your doctor recommends) to the wound area only. Cut a piece of Telfa (or any non-stick dressing) to fit just over the wound and secure it with paper tape. If the wound is small you may use a Band- Aid. Keep area covered for a total of 1 week(s). If the dressing comes off or if you have questions, or concerns about the dressing, please call the office for instructions! POST OPERATIVE INSTRUCTIONS    Activity: Do not lift anything heavier than a gallon of milk for 1 week. Also, avoid strenuous activity such as running, power walking or contact sports. Eating and drinking: Do not drink alcohol for 48 hours after your procedure. Alcohol increases the chances of bleeding. Medicines   -If you have discomfort, take Acetaminophen (Tylenol or Extra Strength Tylenol).  Follow the instructions and warning on the bottle. -If your doctor has prescribed you an Aspirin daily, please keep taking it. Do not take extra Aspirin or medicines containing Aspirin (such as [place] and Excedrin) for 48 hours after your procedure. Bleeding: If bleeding occurs, DO NOT remove the bandage. Put firm pressure on the area with gauze for 20 minutes without peeking. If the bleeding continues, apply pressure for another 20 minutes. If the bleeding does not stop after you apply pressure, call us right away. If you can not call, go to the nearest emergency room or urgent care facility. What to expect:  You may have these symptoms. They are normal and should get better with time:  Swelling. Swelling usually increases for the first 48 hours after your procedure and then begins to improve. Some soreness and redness around your wound. If we worked close to your eyes (forehead, nose, temple, or upper cheeks) your eyes may become swollen and/ or black and blue. Bruising, which could last 1 week or more. Pink and bumpy appearance to the scar. This may happen a few weeks after your procedure. After 4 weeks, you may gently massage the area each day with facial moisturizer or petroleum jelly (Vaseline or Aquaphor). This will help to smooth the skin and improve the appearance of the scar. The color of your scar will fade over time, but it may be pink for several months after the procedure. The scar may take 6 months to 1 year to reach its final color and appearance. \"Spitting\" suture. Occasionally, an inside suture (stitch) does not completely dissolve. When this happens, (generally 4-8 weeks after surgery), it causes a bump or \"pimple\" to form on the scar. This is easily removed and is not at all serious. It does not mean the skin cancer has returned. Contact us if it happens, but do not be alarmed. Vitamin E oil is NOT necessary. A good moisturizer is just as effective. Sunscreen IS necessary.  Use at least and SPF 30 sunscreen daily- even in winter    Call us at [phone] right away if you have any of the following symptoms:  -Bleeding that you can not stop (see highlighted area above). -Pain that lasts longer than 48 hours.  -Your wound becomes more painful, red or hot. -Bruising and swelling that does not begin to improve within the 48 hours or gets worse suddenly.

## 2022-09-28 ENCOUNTER — TELEPHONE (OUTPATIENT)
Dept: SURGERY | Age: 57
End: 2022-09-28

## 2022-09-28 ENCOUNTER — TELEPHONE (OUTPATIENT)
Dept: DERMATOLOGY | Age: 57
End: 2022-09-28

## 2022-09-28 NOTE — TELEPHONE ENCOUNTER
Pt having a bad headache, #10 on the scale of 1-10. She'd like a call back from our nursing staff. Tylenol did not help.

## 2022-09-28 NOTE — TELEPHONE ENCOUNTER
Date of biopsy: 9/23/22    A. Site of biopsy: Central Upper Back  Result: Epidermal Cyst    B. Site of biopsy: Left Central Upper Back  Result, Epidermal Cyst, excised    Plan: No Further treatment    Reviewed results of the biopsy with the patient. Pathology report scanned into patient's chart.

## 2022-09-28 NOTE — TELEPHONE ENCOUNTER
Called pt, had not yet taken a dose of Ibuprofen. Had been taking 1,000mg Tylenol every 8 hours. Instructed on alternating between Tylenol and Ibuprofen every three hours. Patient verbalizes understanding of all of the above, and has no further questions or concerns at this time. Encouraged to call back the office should any questions/concerns arise.  9/28/2022 at 11:42 AM.

## 2022-09-30 RX ORDER — PANTOPRAZOLE SODIUM 40 MG/1
TABLET, DELAYED RELEASE ORAL
Qty: 90 TABLET | Refills: 1 | Status: SHIPPED | OUTPATIENT
Start: 2022-09-30

## 2022-09-30 NOTE — TELEPHONE ENCOUNTER
Requested Prescriptions     Pending Prescriptions Disp Refills    pantoprazole (PROTONIX) 40 MG tablet [Pharmacy Med Name: PANTOPRAZOLE SOD DR 40 MG TAB] 90 tablet 1     Sig: TAKE 1 TABLET BY MOUTH EVERY DAY BEFORE BREAKFAST     Last ov 8/24/2022  Last labs 8/24/22

## 2023-01-11 ENCOUNTER — HOSPITAL ENCOUNTER (OUTPATIENT)
Dept: GENERAL RADIOLOGY | Age: 58
Discharge: HOME | End: 2023-01-11
Payer: COMMERCIAL

## 2023-01-11 DIAGNOSIS — R13.19 INTERMITTENT DYSPHAGIA: ICD-10-CM

## 2023-01-11 PROCEDURE — 74220 X-RAY XM ESOPHAGUS 1CNTRST: CPT

## 2023-02-09 ENCOUNTER — OFFICE VISIT (OUTPATIENT)
Dept: FAMILY MEDICINE CLINIC | Age: 58
End: 2023-02-09
Payer: COMMERCIAL

## 2023-02-09 VITALS
RESPIRATION RATE: 12 BRPM | OXYGEN SATURATION: 97 % | BODY MASS INDEX: 28.32 KG/M2 | DIASTOLIC BLOOD PRESSURE: 70 MMHG | TEMPERATURE: 97.3 F | WEIGHT: 165 LBS | HEART RATE: 80 BPM | SYSTOLIC BLOOD PRESSURE: 102 MMHG

## 2023-02-09 DIAGNOSIS — D50.9 IRON DEFICIENCY ANEMIA, UNSPECIFIED IRON DEFICIENCY ANEMIA TYPE: ICD-10-CM

## 2023-02-09 DIAGNOSIS — R53.1 WEAKNESS: ICD-10-CM

## 2023-02-09 DIAGNOSIS — R53.83 FATIGUE, UNSPECIFIED TYPE: ICD-10-CM

## 2023-02-09 DIAGNOSIS — D45 POLYCYTHEMIA VERA (HCC): ICD-10-CM

## 2023-02-09 DIAGNOSIS — G62.9 NEUROPATHY: ICD-10-CM

## 2023-02-09 DIAGNOSIS — D47.3 ESSENTIAL THROMBOCYTOSIS (HCC): Primary | ICD-10-CM

## 2023-02-09 DIAGNOSIS — Z86.16 HISTORY OF COVID-19: ICD-10-CM

## 2023-02-09 PROCEDURE — 1036F TOBACCO NON-USER: CPT | Performed by: FAMILY MEDICINE

## 2023-02-09 PROCEDURE — G8427 DOCREV CUR MEDS BY ELIG CLIN: HCPCS | Performed by: FAMILY MEDICINE

## 2023-02-09 PROCEDURE — 3017F COLORECTAL CA SCREEN DOC REV: CPT | Performed by: FAMILY MEDICINE

## 2023-02-09 PROCEDURE — 99214 OFFICE O/P EST MOD 30 MIN: CPT | Performed by: FAMILY MEDICINE

## 2023-02-09 PROCEDURE — 93000 ELECTROCARDIOGRAM COMPLETE: CPT | Performed by: FAMILY MEDICINE

## 2023-02-09 PROCEDURE — G8484 FLU IMMUNIZE NO ADMIN: HCPCS | Performed by: FAMILY MEDICINE

## 2023-02-09 PROCEDURE — G8419 CALC BMI OUT NRM PARAM NOF/U: HCPCS | Performed by: FAMILY MEDICINE

## 2023-02-09 SDOH — ECONOMIC STABILITY: FOOD INSECURITY: WITHIN THE PAST 12 MONTHS, THE FOOD YOU BOUGHT JUST DIDN'T LAST AND YOU DIDN'T HAVE MONEY TO GET MORE.: NEVER TRUE

## 2023-02-09 SDOH — ECONOMIC STABILITY: HOUSING INSECURITY
IN THE LAST 12 MONTHS, WAS THERE A TIME WHEN YOU DID NOT HAVE A STEADY PLACE TO SLEEP OR SLEPT IN A SHELTER (INCLUDING NOW)?: NO

## 2023-02-09 SDOH — ECONOMIC STABILITY: FOOD INSECURITY: WITHIN THE PAST 12 MONTHS, YOU WORRIED THAT YOUR FOOD WOULD RUN OUT BEFORE YOU GOT MONEY TO BUY MORE.: NEVER TRUE

## 2023-02-09 SDOH — ECONOMIC STABILITY: INCOME INSECURITY: HOW HARD IS IT FOR YOU TO PAY FOR THE VERY BASICS LIKE FOOD, HOUSING, MEDICAL CARE, AND HEATING?: NOT HARD AT ALL

## 2023-02-09 ASSESSMENT — PATIENT HEALTH QUESTIONNAIRE - PHQ9
1. LITTLE INTEREST OR PLEASURE IN DOING THINGS: 0
SUM OF ALL RESPONSES TO PHQ QUESTIONS 1-9: 0
SUM OF ALL RESPONSES TO PHQ QUESTIONS 1-9: 0
SUM OF ALL RESPONSES TO PHQ9 QUESTIONS 1 & 2: 0
SUM OF ALL RESPONSES TO PHQ QUESTIONS 1-9: 0
SUM OF ALL RESPONSES TO PHQ QUESTIONS 1-9: 0
2. FEELING DOWN, DEPRESSED OR HOPELESS: 0

## 2023-02-09 NOTE — PROGRESS NOTES
Pt states that she did have COVID a few months ago, end of September and had some mild fever, and malaise. Pt states that since then, she feels that she is 'falling apart'. Pt states that she continues to have some persistent fatigue, goig to bed early. Pt working with new oncologist as her prior moved, and was told that sx could be long-covid. Pt does monitor her bloodwork but is concerned that there is something else going on. Pt feels that there is 'nerve pain' and numbness, pain when she puts pressure on symptoms. No true issues of weakness but feels that something is off. No chest pain, but does feels some issues of decrease in endurance. Sob with activity but not at rest.  Pt denies any depression      Except as noted above in the history of present illness, the review of systems is  negative for headache, vision changes, chest pain, shortness of breath, abdominal pain, urinary sx, bowel changes. Past medical, surgical, and social history reviewed and updated  Medications and allergies reviewed and updated      O: /70   Pulse 80   Temp 97.3 °F (36.3 °C) (Temporal)   Resp 12   Wt 165 lb (74.8 kg)   LMP 03/13/2019 (Approximate)   SpO2 97%   BMI 28.32 kg/m²   GEN: No acute distress, cooperative, well nourished, alert. HEENT: PEERLA, EOMI , normocephalic/atraumatic, nares and oropharynx clear. Mucous membranes normal, Tympanic membranes clear bilaterally. Neck: soft, supple, no thyromegaly, mass, no Lymphadenopathy  CV: Regular rate and rhythm, no murmur, rubs, gallops. No edema. Resp: Clear to auscultation bilaterally good air entry bilaterally  No crackles, wheeze. Breathing comfortably. Psych: mood stable, No suicidal thoughts or ideation   Neuro: cranial nerves II-XII intact. Gait within normal limits.   Sensation intact, strength 5/5 bilateral upper extremities, bilateral lower extremities         Current Outpatient Medications   Medication Sig Dispense Refill pantoprazole (PROTONIX) 40 MG tablet TAKE 1 TABLET BY MOUTH EVERY DAY BEFORE BREAKFAST 90 tablet 1    hydroxyurea (HYDREA) 500 MG chemo capsule TAKE ONE CAPSULE BY MOUTH DAILY OR AS DIRECTED BASED ON BLOOD WORK 30 capsule 5    ferrous sulfate 325 (65 FE) MG tablet Take 325 mg by mouth daily (with breakfast). Multiple Vitamin (MULTI-VITAMIN DAILY PO) Take by mouth       No current facility-administered medications for this visit. ASSESSMENT / PLAN:    1. Essential thrombocytosis (HCC)  Chronic  Cont hydroxyuria  Cont f/u with heme/onc  Check f/u bloodwork   - CBC with Auto Differential; Future  - Comprehensive Metabolic Panel; Future  - TSH; Future  - T4, Free; Future  - CK; Future  - Vitamin B12 & Folate; Future  - Vitamin D 25 Hydroxy; Future  - Brain Natriuretic Peptide; Future  - Cortisol AM, Total; Future  - Sedimentation Rate; Future  - Magnesium; Future  - Phosphorus; Future  - EKG 12 Lead    2. Polycythemia vera ([address]  As above  - CBC with Auto Differential; Future  - Comprehensive Metabolic Panel; Future  - TSH; Future  - T4, Free; Future  - CK; Future  - Vitamin B12 & Folate; Future  - Vitamin D 25 Hydroxy; Future  - Brain Natriuretic Peptide; Future  - Cortisol AM, Total; Future  - Sedimentation Rate; Future  - Magnesium; Future  - Phosphorus; Future  - EKG 12 Lead    3. Iron deficiency anemia, unspecified iron deficiency anemia type  Cont f/u with heme/onc  Check f/u bloodwork   - CBC with Auto Differential; Future  - Comprehensive Metabolic Panel; Future  - TSH; Future  - T4, Free; Future  - CK; Future  - Vitamin B12 & Folate; Future  - Vitamin D 25 Hydroxy; Future  - Brain Natriuretic Peptide; Future  - Cortisol AM, Total; Future  - Sedimentation Rate; Future  - Magnesium; Future  - Phosphorus; Future  - EKG 12 Lead    4. Fatigue, unspecified type  New onset s/p COVID a few months ago  Exam nonfocal  EKG normal, unchanged prior  Check bloodwork as below  Management pending results. Consider echo  - CBC with Auto Differential; Future  - Comprehensive Metabolic Panel; Future  - TSH; Future  - T4, Free; Future  - CK; Future  - Vitamin B12 & Folate; Future  - Vitamin D 25 Hydroxy; Future  - Brain Natriuretic Peptide; Future  - Cortisol AM, Total; Future  - Sedimentation Rate; Future  - Magnesium; Future  - Phosphorus; Future  - EKG 12 Lead    5. Weakness  Exam nonfocal  Chjeck bloodwork  Management pending results. - CBC with Auto Differential; Future  - Comprehensive Metabolic Panel; Future  - TSH; Future  - T4, Free; Future  - CK; Future  - Vitamin B12 & Folate; Future  - Vitamin D 25 Hydroxy; Future  - Brain Natriuretic Peptide; Future  - Cortisol AM, Total; Future  - Sedimentation Rate; Future  - Magnesium; Future  - Phosphorus; Future  - EKG 12 Lead    6. Neuropathy  Exam nonfocal  No focal neurologic deficits  Check f/u bloodwork as below  Management pending results. - CBC with Auto Differential; Future  - Comprehensive Metabolic Panel; Future  - TSH; Future  - T4, Free; Future  - CK; Future  - Vitamin B12 & Folate; Future  - Vitamin D 25 Hydroxy; Future  - Brain Natriuretic Peptide; Future  - Cortisol AM, Total; Future  - Sedimentation Rate; Future  - Magnesium; Future  - Phosphorus; Future  - EKG 12 Lead    7. History of COVID-19  Await bloodwork  Consider echo           Follow-up appointment:   Pending bloodwork      Discussed use, benefit, and side effects of all prescribed medications. Barriers to medication compliance addressed. All patient questions answered. Pt voiced understanding. When applicable, patient's outside records were reviewed through [facility]. The patient has signed appropriate paperworks/consents.

## 2023-02-23 ENCOUNTER — HOSPITAL ENCOUNTER (OUTPATIENT)
Dept: MAMMOGRAPHY | Age: 58
Discharge: HOME | End: 2023-02-23
Payer: COMMERCIAL

## 2023-02-23 VITALS — WEIGHT: 165 LBS | HEIGHT: 64 IN | BODY MASS INDEX: 28.17 KG/M2

## 2023-02-23 DIAGNOSIS — Z12.31 VISIT FOR SCREENING MAMMOGRAM: ICD-10-CM

## 2023-02-23 PROCEDURE — 77067 SCR MAMMO BI INCL CAD: CPT

## 2023-04-03 RX ORDER — PANTOPRAZOLE SODIUM 40 MG/1
TABLET, DELAYED RELEASE ORAL
Qty: 90 TABLET | Refills: 1 | Status: SHIPPED | OUTPATIENT
Start: 2023-04-03

## 2023-04-03 NOTE — TELEPHONE ENCOUNTER
Requested Prescriptions     Pending Prescriptions Disp Refills    pantoprazole (PROTONIX) 40 MG tablet [Pharmacy Med Name: PANTOPRAZOLE SOD DR 40 MG TAB] 90 tablet 1     Sig: TAKE 1 TABLET BY MOUTH EVERY DAY BEFORE BREAKFAST         Last OV; 2/09/2023  Last labs; 2/9/2023  No F/u

## 2023-05-09 ENCOUNTER — HOSPITAL ENCOUNTER (EMERGENCY)
Age: 58
Discharge: HOME | End: 2023-05-10
Attending: EMERGENCY MEDICINE
Payer: COMMERCIAL

## 2023-05-09 DIAGNOSIS — T78.40XA ALLERGIC REACTION TO DRUG, INITIAL ENCOUNTER: Primary | ICD-10-CM

## 2023-05-09 PROCEDURE — 2500000003 HC RX 250 WO HCPCS: Performed by: EMERGENCY MEDICINE

## 2023-05-09 PROCEDURE — 6360000002 HC RX W HCPCS: Performed by: EMERGENCY MEDICINE

## 2023-05-09 PROCEDURE — 6360000002 HC RX W HCPCS

## 2023-05-09 PROCEDURE — 2580000003 HC RX 258: Performed by: EMERGENCY MEDICINE

## 2023-05-09 PROCEDURE — 96375 TX/PRO/DX INJ NEW DRUG ADDON: CPT

## 2023-05-09 PROCEDURE — 96374 THER/PROPH/DIAG INJ IV PUSH: CPT

## 2023-05-09 PROCEDURE — 99284 EMERGENCY DEPT VISIT MOD MDM: CPT

## 2023-05-09 PROCEDURE — A4216 STERILE WATER/SALINE, 10 ML: HCPCS | Performed by: EMERGENCY MEDICINE

## 2023-05-09 RX ORDER — EPINEPHRINE 0.3 MG/.3ML
INJECTION SUBCUTANEOUS
Status: COMPLETED
Start: 2023-05-09 | End: 2023-05-09

## 2023-05-09 RX ORDER — EPINEPHRINE 0.3 MG/.3ML
INJECTION SUBCUTANEOUS
Status: DISCONTINUED
Start: 2023-05-09 | End: 2023-05-10 | Stop reason: HOSPADM

## 2023-05-09 RX ORDER — DIPHENHYDRAMINE HYDROCHLORIDE 50 MG/ML
25 INJECTION INTRAMUSCULAR; INTRAVENOUS ONCE
Status: COMPLETED | OUTPATIENT
Start: 2023-05-09 | End: 2023-05-09

## 2023-05-09 RX ADMIN — FAMOTIDINE 20 MG: 10 INJECTION, SOLUTION INTRAVENOUS at 23:27

## 2023-05-09 RX ADMIN — DIPHENHYDRAMINE HYDROCHLORIDE 25 MG: 50 INJECTION, SOLUTION INTRAMUSCULAR; INTRAVENOUS at 23:24

## 2023-05-09 RX ADMIN — EPINEPHRINE: 0.3 INJECTION INTRAMUSCULAR at 22:20

## 2023-05-10 ENCOUNTER — TELEPHONE (OUTPATIENT)
Dept: FAMILY MEDICINE CLINIC | Age: 58
End: 2023-05-10

## 2023-05-10 VITALS
HEART RATE: 89 BPM | WEIGHT: 170 LBS | SYSTOLIC BLOOD PRESSURE: 100 MMHG | DIASTOLIC BLOOD PRESSURE: 79 MMHG | RESPIRATION RATE: 25 BRPM | BODY MASS INDEX: 29.02 KG/M2 | HEIGHT: 64 IN | TEMPERATURE: 97.8 F | OXYGEN SATURATION: 95 %

## 2023-05-10 PROCEDURE — 6360000002 HC RX W HCPCS: Performed by: EMERGENCY MEDICINE

## 2023-05-10 RX ORDER — ONDANSETRON 2 MG/ML
4 INJECTION INTRAMUSCULAR; INTRAVENOUS ONCE
Status: COMPLETED | OUTPATIENT
Start: 2023-05-10 | End: 2023-05-10

## 2023-05-10 RX ORDER — EPINEPHRINE 0.3 MG/.3ML
0.3 INJECTION SUBCUTANEOUS ONCE
Qty: 0.3 ML | Refills: 0 | Status: SHIPPED | OUTPATIENT
Start: 2023-05-10 | End: 2023-05-10

## 2023-05-10 RX ADMIN — ONDANSETRON 4 MG: 2 INJECTION INTRAMUSCULAR; INTRAVENOUS at 00:19

## 2023-05-10 ASSESSMENT — ENCOUNTER SYMPTOMS
TROUBLE SWALLOWING: 1
VOICE CHANGE: 1
SHORTNESS OF BREATH: 1
CHEST TIGHTNESS: 0

## 2023-05-10 NOTE — TELEPHONE ENCOUNTER
I don't think that her current symptoms relate to viral infeciton, but likely an allergic reaction  Please call and see how she is doing at this time  thanks

## 2023-05-10 NOTE — TELEPHONE ENCOUNTER
Patient went to the ER last night with what she thinks is an allergic reaction to a medication prescribed by her orthopaedic doctor. It is called DICLOFENAC. Her hand, feet, and tongue wee swollen, and she broke out in hives. She then had stomach cramps, vomiting and bloody diarrhea. The ER sent her home with epi pens. Her daughter had a viral infection and saw Dr. Della Chopra yesterday, patient was with her daughter all day yesterday, and doesn't know if what she had was contagious. Patient wanted to make Dr. Della Chopra aware of her situation and wants to know what he would advised. She is no longer taking the medication at this point. It was prescribed for her back and knee pain. Please call patient concerning this matter.      [phone] (home) [phone] (work)

## 2023-05-10 NOTE — DISCHARGE INSTRUCTIONS
Please stop taking your diclofenac. Call your prescribing physician to discuss alternate pain medications. Use EpiPen if you have recurrent shortness of breath, throat swelling, or difficulty breathing. Then call 911.

## 2023-05-10 NOTE — ED PROVIDER NOTES
[address] [facility]          ATTENDING PHYSICIAN NOTE       Date of evaluation: 5/9/2023    Chief Complaint     Allergic Reaction (Recently taking dilofenac and felt like her throat was closing up and tongue swelling. ) and Pruritis      History of Present Illness     Mari Mcdermott is a 59 y.o. female who presents with a complaint of voice changes, hoarseness, and throat closing up. Patient was prescribed diclofenac by an outpatient provider for back and knee pain. She states she started feeling itching after first 2 doses and took another dose tonight. She started feeling her throat was closing up and was having difficulty speaking. She was given an EpiPen upon arrival, and the interview was completed after her symptoms were improving after getting epinephrine. Denies any new food exposures, or history of anaphylaxis. ASSESSMENT / PLAN  (MEDICAL DECISION MAKING)     INITIAL VITALS: BP: (!) 113/90, Temp: 97.8 °F (36.6 °C), Pulse: (!) 117, Respirations: 18, SpO2: 91 %      Mari Mcdermott is a 59 y.o. female presenting with an allergic reaction likely to her new NSAID. She is given an EpiPen here and Benadryl. She was observed for 2 hours without recurrent airway swelling, hoarseness, or difficulty breathing. She had vomiting approximately 2 hours into her hospital stay and was given Zofran and observed after this. She requested discharge following this. There is no rash, no airway compromise noted on my evaluation. She was given a prescription for epinephrine, and referral to allergy if needed and instructed to stop her diclofenac. Is this patient to be included in the SEP-1 core measure due to severe sepsis or septic shock? No Exclusion criteria - the patient is NOT to be included for SEP-1 Core Measure due to: Infection is not suspected    Medical Decision Making  Risk  Prescription drug management. Clinical Impression     1.  Allergic reaction to

## 2023-05-11 ENCOUNTER — APPOINTMENT (OUTPATIENT)
Dept: CT IMAGING | Age: 58
End: 2023-05-11
Payer: COMMERCIAL

## 2023-05-11 ENCOUNTER — HOSPITAL ENCOUNTER (EMERGENCY)
Age: 58
Discharge: HOME | End: 2023-05-11
Attending: EMERGENCY MEDICINE
Payer: COMMERCIAL

## 2023-05-11 VITALS
HEART RATE: 82 BPM | OXYGEN SATURATION: 100 % | DIASTOLIC BLOOD PRESSURE: 48 MMHG | TEMPERATURE: 97.6 F | SYSTOLIC BLOOD PRESSURE: 126 MMHG | BODY MASS INDEX: 28.17 KG/M2 | HEIGHT: 64 IN | WEIGHT: 165 LBS | RESPIRATION RATE: 16 BRPM

## 2023-05-11 DIAGNOSIS — K52.9 COLITIS: Primary | ICD-10-CM

## 2023-05-11 LAB
ALBUMIN SERPL-MCNC: 4.5 G/DL (ref 3.4–5)
ALBUMIN/GLOB SERPL: 1.5 {RATIO} (ref 1.1–2.2)
ALP SERPL-CCNC: 84 U/L (ref 40–129)
ALT SERPL-CCNC: 36 U/L (ref 10–40)
ANION GAP SERPL CALCULATED.3IONS-SCNC: 13 MMOL/L (ref 3–16)
AST SERPL-CCNC: 30 U/L (ref 15–37)
BASOPHILS # BLD: 0 K/UL (ref 0–0.2)
BASOPHILS NFR BLD: 0.3 %
BILIRUB SERPL-MCNC: 0.7 MG/DL (ref 0–1)
BILIRUB UR QL STRIP.AUTO: NEGATIVE
BUN SERPL-MCNC: 10 MG/DL (ref 7–20)
CALCIUM SERPL-MCNC: 9.3 MG/DL (ref 8.3–10.6)
CHLORIDE SERPL-SCNC: 102 MMOL/L (ref 99–110)
CLARITY UR: CLEAR
CO2 SERPL-SCNC: 24 MMOL/L (ref 21–32)
COLOR UR: YELLOW
CREAT SERPL-MCNC: 0.7 MG/DL (ref 0.6–1.2)
DEPRECATED RDW RBC AUTO: 15.9 % (ref 12.4–15.4)
EKG ATRIAL RATE: 85 BPM
EKG DIAGNOSIS: NORMAL
EKG P AXIS: 16 DEGREES
EKG P-R INTERVAL: 134 MS
EKG Q-T INTERVAL: 350 MS
EKG QRS DURATION: 72 MS
EKG QTC CALCULATION (BAZETT): 416 MS
EKG R AXIS: 20 DEGREES
EKG T AXIS: 43 DEGREES
EKG VENTRICULAR RATE: 85 BPM
EOSINOPHIL # BLD: 0.1 K/UL (ref 0–0.6)
EOSINOPHIL NFR BLD: 2.1 %
GFR SERPLBLD CREATININE-BSD FMLA CKD-EPI: >60 ML/MIN/{1.73_M2}
GLUCOSE SERPL-MCNC: 137 MG/DL (ref 70–99)
GLUCOSE UR STRIP.AUTO-MCNC: NEGATIVE MG/DL
HCT VFR BLD AUTO: 41.5 % (ref 36–48)
HGB BLD-MCNC: 14.3 G/DL (ref 12–16)
HGB UR QL STRIP.AUTO: ABNORMAL
KETONES UR STRIP.AUTO-MCNC: NEGATIVE MG/DL
LACTATE BLDV-SCNC: 1.4 MMOL/L (ref 0.4–2)
LEUKOCYTE ESTERASE UR QL STRIP.AUTO: NEGATIVE
LIPASE SERPL-CCNC: 30 U/L (ref 13–60)
LYMPHOCYTES # BLD: 1.1 K/UL (ref 1–5.1)
LYMPHOCYTES NFR BLD: 17.4 %
MCH RBC QN AUTO: 35.4 PG (ref 26–34)
MCHC RBC AUTO-ENTMCNC: 34.4 G/DL (ref 31–36)
MCV RBC AUTO: 103.1 FL (ref 80–100)
MONOCYTES # BLD: 0.6 K/UL (ref 0–1.3)
MONOCYTES NFR BLD: 8.6 %
NEUTROPHILS # BLD: 4.7 K/UL (ref 1.7–7.7)
NEUTROPHILS NFR BLD: 71.6 %
NITRITE UR QL STRIP.AUTO: NEGATIVE
PH UR STRIP.AUTO: 6.5 [PH] (ref 5–8)
PLATELET # BLD AUTO: 309 K/UL (ref 135–450)
PMV BLD AUTO: 7.5 FL (ref 5–10.5)
POTASSIUM SERPL-SCNC: 3.6 MMOL/L (ref 3.5–5.1)
PROT SERPL-MCNC: 7.5 G/DL (ref 6.4–8.2)
PROT UR STRIP.AUTO-MCNC: NEGATIVE MG/DL
RBC # BLD AUTO: 4.03 M/UL (ref 4–5.2)
RBC #/AREA URNS HPF: NORMAL /HPF (ref 0–4)
SODIUM SERPL-SCNC: 139 MMOL/L (ref 136–145)
SP GR UR STRIP.AUTO: <=1.005 (ref 1–1.03)
UA COMPLETE W REFLEX CULTURE PNL UR: ABNORMAL
UA DIPSTICK W REFLEX MICRO PNL UR: YES
URN SPEC COLLECT METH UR: ABNORMAL
UROBILINOGEN UR STRIP-ACNC: 0.2 E.U./DL
WBC # BLD AUTO: 6.6 K/UL (ref 4–11)
WBC #/AREA URNS HPF: NORMAL /HPF (ref 0–5)

## 2023-05-11 PROCEDURE — 93005 ELECTROCARDIOGRAM TRACING: CPT | Performed by: EMERGENCY MEDICINE

## 2023-05-11 PROCEDURE — 6360000002 HC RX W HCPCS: Performed by: EMERGENCY MEDICINE

## 2023-05-11 PROCEDURE — 96374 THER/PROPH/DIAG INJ IV PUSH: CPT

## 2023-05-11 PROCEDURE — 74174 CTA ABD&PLVS W/CONTRAST: CPT

## 2023-05-11 PROCEDURE — 83605 ASSAY OF LACTIC ACID: CPT

## 2023-05-11 PROCEDURE — 99285 EMERGENCY DEPT VISIT HI MDM: CPT

## 2023-05-11 PROCEDURE — 81001 URINALYSIS AUTO W/SCOPE: CPT

## 2023-05-11 PROCEDURE — 36415 COLL VENOUS BLD VENIPUNCTURE: CPT

## 2023-05-11 PROCEDURE — 80053 COMPREHEN METABOLIC PANEL: CPT

## 2023-05-11 PROCEDURE — 85025 COMPLETE CBC W/AUTO DIFF WBC: CPT

## 2023-05-11 PROCEDURE — 83690 ASSAY OF LIPASE: CPT

## 2023-05-11 PROCEDURE — 6360000004 HC RX CONTRAST MEDICATION: Performed by: EMERGENCY MEDICINE

## 2023-05-11 RX ORDER — ASPIRIN 81 MG/1
81 TABLET, CHEWABLE ORAL DAILY
COMMUNITY
End: 2023-05-18

## 2023-05-11 RX ORDER — MORPHINE SULFATE 2 MG/ML
4 INJECTION, SOLUTION INTRAMUSCULAR; INTRAVENOUS
Status: COMPLETED | OUTPATIENT
Start: 2023-05-11 | End: 2023-05-11

## 2023-05-11 RX ORDER — CIPROFLOXACIN 500 MG/1
500 TABLET, FILM COATED ORAL 2 TIMES DAILY
Qty: 14 TABLET | Refills: 0 | Status: SHIPPED | OUTPATIENT
Start: 2023-05-11 | End: 2023-05-18

## 2023-05-11 RX ORDER — METRONIDAZOLE 500 MG/1
500 TABLET ORAL 2 TIMES DAILY
Qty: 14 TABLET | Refills: 0 | Status: SHIPPED | OUTPATIENT
Start: 2023-05-11 | End: 2023-05-18

## 2023-05-11 RX ADMIN — IOPAMIDOL 75 ML: 755 INJECTION, SOLUTION INTRAVENOUS at 09:59

## 2023-05-11 RX ADMIN — MORPHINE SULFATE 4 MG: 2 INJECTION, SOLUTION INTRAMUSCULAR; INTRAVENOUS at 09:28

## 2023-05-11 ASSESSMENT — PAIN SCALES - GENERAL
PAINLEVEL_OUTOF10: 5
PAINLEVEL_OUTOF10: 4

## 2023-05-11 ASSESSMENT — PAIN DESCRIPTION - PAIN TYPE: TYPE: ACUTE PAIN

## 2023-05-11 ASSESSMENT — PAIN - FUNCTIONAL ASSESSMENT: PAIN_FUNCTIONAL_ASSESSMENT: 0-10

## 2023-05-11 ASSESSMENT — PAIN DESCRIPTION - ORIENTATION: ORIENTATION: LOWER

## 2023-05-11 ASSESSMENT — PAIN DESCRIPTION - LOCATION: LOCATION: ABDOMEN

## 2023-05-11 ASSESSMENT — PAIN DESCRIPTION - DESCRIPTORS: DESCRIPTORS: CRAMPING

## 2023-05-11 NOTE — ED PROVIDER NOTES
[address] [facility]          ATTENDING PHYSICIAN NOTE       Date of evaluation: 2023    Chief Complaint     Diarrhea (Pt reports bloody diarrhea x 2 days, reports that she is passing clots. Low abd cramps. Able to tolerate po intake this morning )      History of Present Illness     Merry Mendieta is a 59 y.o. female who presents to the emergency room today complaining of blood in her stools. Patient states this is been bothering her since Tuesday, on that day she had an allergic reaction received an EpiPen and some steroids. She has noticed since then that she has had some crampy lower abdominal pain and bright red blood per rectum. She states this is both bright red blood as well as clots mixed in with her stool. Denies any upper abdominal pain. Drinks occasionally has a history of GERD. Had a colonoscopy remotely but does not recall the results of it. Patient endorses some increased bruising in her legs recently otherwise states that she has been feeling fine. Review of Systems     Review of Systems  All systems were reviewed with the patient/family and negative except as otherwise documented in the HPI. Past Medical, Surgical, Family, and Social History     She has a past medical history of Anxiety, Arthritis, Depression, Gastroesophageal reflux disease without esophagitis, Headache, History of renal insufficiency syndrome, Hypercholesterolemia, Iron deficiency anemia, Migraine with aura and without status migrainosus, not intractable, Phyllodes tumor, Polycythemia vera(238.4), Reactive airway disease, and Thrombocytosis. She has a past surgical history that includes  section; Mandible surgery; Knee arthroscopy; Breast lumpectomy (Left, 8 18 15); Breast biopsy (2011); Breast biopsy (Bilateral, 2015); and Breast biopsy (Left, 8 18 15).   Her family history includes Breast Cancer in her maternal aunt; Breast Cancer (age of onset: 52) in her mother;

## 2023-05-18 ENCOUNTER — OFFICE VISIT (OUTPATIENT)
Dept: FAMILY MEDICINE CLINIC | Age: 58
End: 2023-05-18
Payer: COMMERCIAL

## 2023-05-18 VITALS
TEMPERATURE: 97.8 F | RESPIRATION RATE: 14 BRPM | OXYGEN SATURATION: 98 % | SYSTOLIC BLOOD PRESSURE: 110 MMHG | DIASTOLIC BLOOD PRESSURE: 62 MMHG | HEART RATE: 85 BPM | WEIGHT: 163.2 LBS | BODY MASS INDEX: 28.01 KG/M2

## 2023-05-18 DIAGNOSIS — D75.839 THROMBOCYTOSIS: ICD-10-CM

## 2023-05-18 DIAGNOSIS — T78.40XS ALLERGIC REACTION, SEQUELA: ICD-10-CM

## 2023-05-18 DIAGNOSIS — D45 POLYCYTHEMIA VERA (HCC): ICD-10-CM

## 2023-05-18 DIAGNOSIS — K52.9 COLITIS: ICD-10-CM

## 2023-05-18 DIAGNOSIS — R19.7 BLOODY DIARRHEA: Primary | ICD-10-CM

## 2023-05-18 PROCEDURE — 99214 OFFICE O/P EST MOD 30 MIN: CPT | Performed by: FAMILY MEDICINE

## 2023-05-18 PROCEDURE — G8427 DOCREV CUR MEDS BY ELIG CLIN: HCPCS | Performed by: FAMILY MEDICINE

## 2023-05-18 PROCEDURE — 1036F TOBACCO NON-USER: CPT | Performed by: FAMILY MEDICINE

## 2023-05-18 PROCEDURE — 3017F COLORECTAL CA SCREEN DOC REV: CPT | Performed by: FAMILY MEDICINE

## 2023-05-18 PROCEDURE — G8419 CALC BMI OUT NRM PARAM NOF/U: HCPCS | Performed by: FAMILY MEDICINE

## 2023-05-18 NOTE — PROGRESS NOTES
Here for hospital follow up and recheck. Pt was given diclofenac by ortho for some knee pain and developed what seemed like allergic reaction with throat tightness, hoarseness. Did get a little shortness of breath. Pt was seen in ER at [facility] and was given epipen, and sx improved. Pt was given benadryl and pepcid. Pt called the next day for f/u and with some concerns of acute onset of very bloody diarrhea and stomach cramps. On call provider referred her back to ER for evaluation. This seemed to start after epipen and steroids for allergic reaction. Pt was evaluated with bloodwork, showing normal CBC, and CT showing some inflammation / possible colitis. CTA abdomen was nonfocal, no s/s of bowel ischemia. Pt was d/c home on cipro and flagyl and was told to follow up here for reeval. Of note, last colonoscopy was 7/2015 and was normal.  Pt does not have any loose bowels, but softer bowels and more frequently. Blood is resolved. No vomiting at this time, mild nausea      Except as noted above in the history of present illness, the review of systems is  negative for headache, vision changes, chest pain, shortness of breath,     Past medical, surgical, and social history reviewed and updated  Medications and allergies reviewed and updated      O: /62   Pulse 85   Temp 97.8 °F (36.6 °C) (Temporal)   Resp 14   Wt 163 lb 3.2 oz (74 kg)   LMP 03/13/2019 (Approximate)   SpO2 98%   BMI 28.01 kg/m²   GEN: No acute distress, cooperative, well nourished, alert. HEENT: PEERLA, EOMI , normocephalic/atraumatic, nares and oropharynx clear. Mucous membranes normal, Tympanic membranes clear bilaterally. Neck: soft, supple, no thyromegaly, mass, no Lymphadenopathy  CV: Regular rate and rhythm, no murmur, rubs, gallops. No edema. Resp: Clear to auscultation bilaterally good air entry bilaterally  No crackles, wheeze. Breathing comfortably.    Psych: mood stable, No suicidal thoughts or ideation   Abd: soft,

## 2023-05-24 ENCOUNTER — OFFICE VISIT (OUTPATIENT)
Dept: DERMATOLOGY | Age: 58
End: 2023-05-24
Payer: COMMERCIAL

## 2023-05-24 DIAGNOSIS — D22.9 MULTIPLE NEVI: ICD-10-CM

## 2023-05-24 DIAGNOSIS — L82.1 SK (SEBORRHEIC KERATOSIS): Primary | ICD-10-CM

## 2023-05-24 DIAGNOSIS — C44.712 BASAL CELL CARCINOMA (BCC) OF RIGHT LOWER EXTREMITY: ICD-10-CM

## 2023-05-24 PROCEDURE — 1036F TOBACCO NON-USER: CPT | Performed by: DERMATOLOGY

## 2023-05-24 PROCEDURE — 17260 DSTRJ MAL LES T/A/L 0.5 CM/<: CPT | Performed by: DERMATOLOGY

## 2023-05-24 PROCEDURE — 99213 OFFICE O/P EST LOW 20 MIN: CPT | Performed by: DERMATOLOGY

## 2023-05-24 PROCEDURE — G8427 DOCREV CUR MEDS BY ELIG CLIN: HCPCS | Performed by: DERMATOLOGY

## 2023-05-24 PROCEDURE — 3017F COLORECTAL CA SCREEN DOC REV: CPT | Performed by: DERMATOLOGY

## 2023-05-24 PROCEDURE — G8419 CALC BMI OUT NRM PARAM NOF/U: HCPCS | Performed by: DERMATOLOGY

## 2023-05-30 LAB — DERMATOLOGY PATHOLOGY REPORT: ABNORMAL

## 2023-09-27 ENCOUNTER — OFFICE VISIT (OUTPATIENT)
Dept: FAMILY MEDICINE CLINIC | Age: 58
End: 2023-09-27
Payer: COMMERCIAL

## 2023-09-27 VITALS
HEART RATE: 91 BPM | DIASTOLIC BLOOD PRESSURE: 68 MMHG | SYSTOLIC BLOOD PRESSURE: 118 MMHG | TEMPERATURE: 98.1 F | BODY MASS INDEX: 29.7 KG/M2 | OXYGEN SATURATION: 98 % | RESPIRATION RATE: 14 BRPM | WEIGHT: 173 LBS

## 2023-09-27 DIAGNOSIS — G43.109 MIGRAINE WITH AURA AND WITHOUT STATUS MIGRAINOSUS, NOT INTRACTABLE: ICD-10-CM

## 2023-09-27 DIAGNOSIS — R41.3 MEMORY LOSS: ICD-10-CM

## 2023-09-27 DIAGNOSIS — D45 POLYCYTHEMIA VERA (HCC): Primary | ICD-10-CM

## 2023-09-27 DIAGNOSIS — Z23 NEED FOR INFLUENZA VACCINATION: ICD-10-CM

## 2023-09-27 DIAGNOSIS — K21.9 GASTROESOPHAGEAL REFLUX DISEASE WITHOUT ESOPHAGITIS: ICD-10-CM

## 2023-09-27 DIAGNOSIS — E78.00 HYPERCHOLESTEROLEMIA: ICD-10-CM

## 2023-09-27 DIAGNOSIS — D47.3 ESSENTIAL THROMBOCYTOSIS (HCC): ICD-10-CM

## 2023-09-27 PROCEDURE — G8482 FLU IMMUNIZE ORDER/ADMIN: HCPCS | Performed by: FAMILY MEDICINE

## 2023-09-27 PROCEDURE — 1036F TOBACCO NON-USER: CPT | Performed by: FAMILY MEDICINE

## 2023-09-27 PROCEDURE — 99214 OFFICE O/P EST MOD 30 MIN: CPT | Performed by: FAMILY MEDICINE

## 2023-09-27 PROCEDURE — G8419 CALC BMI OUT NRM PARAM NOF/U: HCPCS | Performed by: FAMILY MEDICINE

## 2023-09-27 PROCEDURE — G8427 DOCREV CUR MEDS BY ELIG CLIN: HCPCS | Performed by: FAMILY MEDICINE

## 2023-09-27 PROCEDURE — 3017F COLORECTAL CA SCREEN DOC REV: CPT | Performed by: FAMILY MEDICINE

## 2023-09-27 RX ORDER — ASPIRIN 81 MG/1
81 TABLET ORAL 2 TIMES DAILY
COMMUNITY

## 2023-09-27 RX ORDER — PANTOPRAZOLE SODIUM 40 MG/1
40 TABLET, DELAYED RELEASE ORAL
Qty: 90 TABLET | Refills: 1 | Status: SHIPPED | OUTPATIENT
Start: 2023-09-27

## 2023-09-27 RX ORDER — HYDROXYUREA 500 MG/1
1000 CAPSULE ORAL DAILY
COMMUNITY

## 2023-09-28 DIAGNOSIS — D47.3 ESSENTIAL THROMBOCYTOSIS (HCC): ICD-10-CM

## 2023-09-28 DIAGNOSIS — E78.00 HYPERCHOLESTEROLEMIA: ICD-10-CM

## 2023-09-28 DIAGNOSIS — R41.3 MEMORY LOSS: ICD-10-CM

## 2023-09-28 LAB
25(OH)D3 SERPL-MCNC: 38.6 NG/ML
ALBUMIN SERPL-MCNC: 4.4 G/DL (ref 3.4–5)
ALBUMIN/GLOB SERPL: 2.2 {RATIO} (ref 1.1–2.2)
ALP SERPL-CCNC: 88 U/L (ref 40–129)
ALT SERPL-CCNC: 10 U/L (ref 10–40)
ANION GAP SERPL CALCULATED.3IONS-SCNC: 8 MMOL/L (ref 3–16)
AST SERPL-CCNC: 13 U/L (ref 15–37)
BILIRUB SERPL-MCNC: 0.3 MG/DL (ref 0–1)
BUN SERPL-MCNC: 14 MG/DL (ref 7–20)
CALCIUM SERPL-MCNC: 8.9 MG/DL (ref 8.3–10.6)
CHLORIDE SERPL-SCNC: 105 MMOL/L (ref 99–110)
CHOLEST SERPL-MCNC: 231 MG/DL (ref 0–199)
CO2 SERPL-SCNC: 29 MMOL/L (ref 21–32)
CREAT SERPL-MCNC: 0.7 MG/DL (ref 0.6–1.2)
FOLATE SERPL-MCNC: 10.65 NG/ML (ref 4.78–24.2)
GFR SERPLBLD CREATININE-BSD FMLA CKD-EPI: >60 ML/MIN/{1.73_M2}
GLUCOSE SERPL-MCNC: 81 MG/DL (ref 70–99)
HDLC SERPL-MCNC: 80 MG/DL (ref 40–60)
LDLC SERPL CALC-MCNC: 136 MG/DL
POTASSIUM SERPL-SCNC: 4.4 MMOL/L (ref 3.5–5.1)
PROT SERPL-MCNC: 6.4 G/DL (ref 6.4–8.2)
SODIUM SERPL-SCNC: 142 MMOL/L (ref 136–145)
T4 FREE SERPL-MCNC: 1.1 NG/DL (ref 0.9–1.8)
TRIGL SERPL-MCNC: 76 MG/DL (ref 0–150)
TSH SERPL DL<=0.005 MIU/L-ACNC: 2.34 UIU/ML (ref 0.27–4.2)
VIT B12 SERPL-MCNC: 307 PG/ML (ref 211–911)
VLDLC SERPL CALC-MCNC: 15 MG/DL

## 2023-09-29 LAB
EST. AVERAGE GLUCOSE BLD GHB EST-MCNC: 105.4 MG/DL
HBA1C MFR BLD: 5.3 %

## 2023-10-02 PROCEDURE — 90471 IMMUNIZATION ADMIN: CPT | Performed by: FAMILY MEDICINE

## 2023-10-02 PROCEDURE — 90674 CCIIV4 VAC NO PRSV 0.5 ML IM: CPT | Performed by: FAMILY MEDICINE

## 2023-10-27 ENCOUNTER — OFFICE VISIT (OUTPATIENT)
Dept: FAMILY MEDICINE CLINIC | Age: 58
End: 2023-10-27
Payer: COMMERCIAL

## 2023-10-27 VITALS
TEMPERATURE: 97.8 F | DIASTOLIC BLOOD PRESSURE: 82 MMHG | BODY MASS INDEX: 30.73 KG/M2 | SYSTOLIC BLOOD PRESSURE: 118 MMHG | WEIGHT: 179 LBS | HEART RATE: 87 BPM | OXYGEN SATURATION: 99 %

## 2023-10-27 DIAGNOSIS — R42 DIZZINESS: ICD-10-CM

## 2023-10-27 DIAGNOSIS — D47.3 ESSENTIAL THROMBOCYTOSIS (HCC): Primary | ICD-10-CM

## 2023-10-27 DIAGNOSIS — H81.13 BENIGN PAROXYSMAL POSITIONAL VERTIGO DUE TO BILATERAL VESTIBULAR DISORDER: ICD-10-CM

## 2023-10-27 PROCEDURE — 99214 OFFICE O/P EST MOD 30 MIN: CPT | Performed by: FAMILY MEDICINE

## 2023-10-27 PROCEDURE — 1036F TOBACCO NON-USER: CPT | Performed by: FAMILY MEDICINE

## 2023-10-27 PROCEDURE — G8427 DOCREV CUR MEDS BY ELIG CLIN: HCPCS | Performed by: FAMILY MEDICINE

## 2023-10-27 PROCEDURE — 3017F COLORECTAL CA SCREEN DOC REV: CPT | Performed by: FAMILY MEDICINE

## 2023-10-27 PROCEDURE — G8417 CALC BMI ABV UP PARAM F/U: HCPCS | Performed by: FAMILY MEDICINE

## 2023-10-27 PROCEDURE — G8482 FLU IMMUNIZE ORDER/ADMIN: HCPCS | Performed by: FAMILY MEDICINE

## 2023-10-27 RX ORDER — METHYLPREDNISOLONE 4 MG/1
TABLET ORAL
Qty: 21 TABLET | Refills: 0 | Status: SHIPPED | OUTPATIENT
Start: 2023-10-27

## 2023-11-28 ENCOUNTER — OFFICE VISIT (OUTPATIENT)
Dept: DERMATOLOGY | Age: 58
End: 2023-11-28

## 2023-11-28 DIAGNOSIS — C44.519 BASAL CELL CARCINOMA (BCC) OF BACK: Primary | ICD-10-CM

## 2023-11-28 DIAGNOSIS — D22.9 MULTIPLE NEVI: ICD-10-CM

## 2023-11-28 DIAGNOSIS — Z85.828 HISTORY OF BASAL CELL CARCINOMA: ICD-10-CM

## 2023-11-28 RX ORDER — MECLIZINE HCL 12.5 MG/1
12.5 TABLET ORAL 3 TIMES DAILY PRN
COMMUNITY
Start: 2023-10-25

## 2023-11-28 NOTE — PATIENT INSTRUCTIONS
Biopsy Wound Care Instructions    Keep the bandage in place for 24 hours. Cleanse the wound with mild soapy water daily  Gently dry the area. Apply Vaseline or petroleum jelly to the wound using a cotton tipped applicator. Cover with a clean bandage. Repeat this process until the biopsy site is healed. If you had stitches placed, continue treating the site until the stitches are removed. Remember to make an appointment to return to have your stitches removed by our staff. You may shower and bathe as usual.       ** Biopsy results generally take around 7 business days to come back. If you have not heard from us by then, please call the office at [phone]. *Please note that biopsy results are released to both the patient and physician at the same time in [address] [place] [address]. Please allow time for your physician to review the results. One of our staff members will reach out to you with the results and plan.

## 2023-11-28 NOTE — PROGRESS NOTES
[facility]  Sayra Ortiz MD  [phone]      Priti Rascon  1965    59 y.o. female     Date of Visit: 11/28/2023    Chief Complaint: skin lesions    History of Present Illness:    1. Unknown duration of an asymptomatic lesion on the right lower back. 2.  She has multiple moles on the trunk and extremities-not aware of any changes in size, color, or shape. 3.  She has a history of BCCs-denies any signs of recurrence. Dermatologic history:  Small nodular superficial BCC of the right anterior thigh-treated with curettage on 5/24/2023. Nodular BCC of the right upper forehead-treated with Mohs by Dr. Bertram Lindsay on 9/27/2022. Superficial BCC of the right clavicular region-treated with curettage on 9/24/2021. Nodular BCC on the central forehead-treated with Mohs by Dr. Bertram Lindsay on 2/14/2019. Superficial basal cell carcinoma on the right clavicular region-treated with curettage on 2/1/2019. Nodular BCC on the right side of the forehead-treated with Mohs by Dr. Bertram Lindsay on 8/16/2018. Nodular BCC on the left side of the forehead-treated with Mohs by Dr. Bertram Lindsay on 8/9/2018. Has several grandchildren. Review of Systems:  Gen: Feels well, good sense of health. Past Medical History, Family History, Surgical History, Medications and Allergies reviewed.     Past Medical History:   Diagnosis Date    Anxiety     Arthritis     Depression     Gastroesophageal reflux disease without esophagitis 05/30/2017    Headache     History of renal insufficiency syndrome     Hypercholesterolemia     Iron deficiency anemia     Migraine with aura and without status migrainosus, not intractable 02/05/2016    Phyllodes tumor 07/2015    L breast    Polycythemia vera(238.4)     Reactive airway disease 01/29/2018    Thrombocytosis      Past Surgical History:   Procedure Laterality Date    BREAST BIOPSY  11/2011    bilateral  X 5 - benign    BREAST BIOPSY Bilateral 8/2015    phyllodes tumor Left

## 2023-11-30 ENCOUNTER — OFFICE VISIT (OUTPATIENT)
Dept: FAMILY MEDICINE CLINIC | Age: 58
End: 2023-11-30
Payer: COMMERCIAL

## 2023-11-30 VITALS
BODY MASS INDEX: 31.31 KG/M2 | WEIGHT: 182.4 LBS | OXYGEN SATURATION: 97 % | TEMPERATURE: 98.1 F | DIASTOLIC BLOOD PRESSURE: 70 MMHG | HEART RATE: 88 BPM | RESPIRATION RATE: 18 BRPM | SYSTOLIC BLOOD PRESSURE: 120 MMHG

## 2023-11-30 DIAGNOSIS — D47.3 ESSENTIAL THROMBOCYTOSIS (HCC): ICD-10-CM

## 2023-11-30 DIAGNOSIS — D45 POLYCYTHEMIA VERA (HCC): Primary | ICD-10-CM

## 2023-11-30 DIAGNOSIS — M25.50 ARTHRALGIA OF MULTIPLE JOINTS: ICD-10-CM

## 2023-11-30 PROCEDURE — G8427 DOCREV CUR MEDS BY ELIG CLIN: HCPCS | Performed by: FAMILY MEDICINE

## 2023-11-30 PROCEDURE — G8417 CALC BMI ABV UP PARAM F/U: HCPCS | Performed by: FAMILY MEDICINE

## 2023-11-30 PROCEDURE — 3017F COLORECTAL CA SCREEN DOC REV: CPT | Performed by: FAMILY MEDICINE

## 2023-11-30 PROCEDURE — G8482 FLU IMMUNIZE ORDER/ADMIN: HCPCS | Performed by: FAMILY MEDICINE

## 2023-11-30 PROCEDURE — 1036F TOBACCO NON-USER: CPT | Performed by: FAMILY MEDICINE

## 2023-11-30 PROCEDURE — 99214 OFFICE O/P EST MOD 30 MIN: CPT | Performed by: FAMILY MEDICINE

## 2023-12-01 LAB — DERMATOLOGY PATHOLOGY REPORT: ABNORMAL

## 2024-02-16 ENCOUNTER — HOSPITAL ENCOUNTER (OUTPATIENT)
Dept: GENERAL RADIOLOGY | Age: 59
Discharge: HOME | End: 2024-02-16

## 2024-02-16 DIAGNOSIS — R52 PAIN: ICD-10-CM

## 2024-02-16 PROCEDURE — 73610 X-RAY EXAM OF ANKLE: CPT

## 2024-02-16 PROCEDURE — 73630 X-RAY EXAM OF FOOT: CPT

## 2024-04-08 RX ORDER — PANTOPRAZOLE SODIUM 40 MG/1
40 TABLET, DELAYED RELEASE ORAL
Qty: 90 TABLET | Refills: 1 | Status: SHIPPED | OUTPATIENT
Start: 2024-04-08 | End: 2024-04-09 | Stop reason: SDUPTHER

## 2024-04-08 NOTE — TELEPHONE ENCOUNTER
Last ov 11-30-23  No future appt    Requested Prescriptions     Pending Prescriptions Disp Refills    pantoprazole (PROTONIX) 40 MG tablet 90 tablet 1     Sig: Take 1 tablet by mouth every morning (before breakfast)

## 2024-04-09 RX ORDER — PANTOPRAZOLE SODIUM 40 MG/1
40 TABLET, DELAYED RELEASE ORAL
Qty: 90 TABLET | Refills: 1 | Status: SHIPPED | OUTPATIENT
Start: 2024-04-09

## 2024-04-09 NOTE — TELEPHONE ENCOUNTER
Requested Prescriptions     Pending Prescriptions Disp Refills    pantoprazole (PROTONIX) 40 MG tablet 90 tablet 1     Sig: Take 1 tablet by mouth every morning (before breakfast)          Last Office Visit: 11/30/2023     Next Office Visit: Visit date not found

## 2024-04-09 NOTE — TELEPHONE ENCOUNTER
Pt called because her medication was never sent to the pharmacy yesterday. Please send to:    [facility]   [address] [place] [address]         pantoprazole (PROTONIX) 40 MG tablet [[id number]]    Order Details  Dose: 40 mg Route: Oral Frequency: DAILY BEFORE BREAKFAST   Dispense Quantity: 90 tablet Refills: 1          Sig: Take 1 tablet by mouth every morning (before breakfast)         Start Date: 04/08/24 End Date: --   Written Date: 04/08/24 Expiration Date: 04/08/25   Original Order: pantoprazole (PROTONIX) 40 MG tablet [[id number]]   Providers    Authorizing Provider: Alvarez Mckinley MD NPI: [id number]   Ordering User: Alvarez Mckinley MD

## 2024-04-18 LAB — MAMMOGRAPHY, EXTERNAL: NEGATIVE

## 2024-05-03 ENCOUNTER — OFFICE VISIT (OUTPATIENT)
Dept: FAMILY MEDICINE CLINIC | Age: 59
End: 2024-05-03

## 2024-05-03 VITALS
TEMPERATURE: 98.1 F | OXYGEN SATURATION: 98 % | DIASTOLIC BLOOD PRESSURE: 66 MMHG | HEART RATE: 90 BPM | BODY MASS INDEX: 32.48 KG/M2 | RESPIRATION RATE: 14 BRPM | SYSTOLIC BLOOD PRESSURE: 122 MMHG | WEIGHT: 189.2 LBS

## 2024-05-03 DIAGNOSIS — F51.01 PRIMARY INSOMNIA: ICD-10-CM

## 2024-05-03 DIAGNOSIS — M25.562 ACUTE PAIN OF LEFT KNEE: ICD-10-CM

## 2024-05-03 DIAGNOSIS — K21.9 GASTROESOPHAGEAL REFLUX DISEASE WITHOUT ESOPHAGITIS: Primary | ICD-10-CM

## 2024-05-03 DIAGNOSIS — M71.22 SYNOVIAL CYST OF LEFT POPLITEAL SPACE: ICD-10-CM

## 2024-05-03 DIAGNOSIS — D45 POLYCYTHEMIA VERA (HCC): ICD-10-CM

## 2024-05-03 RX ORDER — TRAZODONE HYDROCHLORIDE 50 MG/1
50-100 TABLET ORAL NIGHTLY
Qty: 60 TABLET | Refills: 5 | Status: SHIPPED | OUTPATIENT
Start: 2024-05-03

## 2024-05-03 RX ORDER — ESOMEPRAZOLE MAGNESIUM 40 MG/1
40 CAPSULE, DELAYED RELEASE ORAL
Qty: 30 CAPSULE | Refills: 5 | Status: SHIPPED | OUTPATIENT
Start: 2024-05-03

## 2024-05-03 SDOH — ECONOMIC STABILITY: FOOD INSECURITY: WITHIN THE PAST 12 MONTHS, THE FOOD YOU BOUGHT JUST DIDN'T LAST AND YOU DIDN'T HAVE MONEY TO GET MORE.: NEVER TRUE

## 2024-05-03 SDOH — ECONOMIC STABILITY: FOOD INSECURITY: WITHIN THE PAST 12 MONTHS, YOU WORRIED THAT YOUR FOOD WOULD RUN OUT BEFORE YOU GOT MONEY TO BUY MORE.: NEVER TRUE

## 2024-05-03 SDOH — ECONOMIC STABILITY: INCOME INSECURITY: HOW HARD IS IT FOR YOU TO PAY FOR THE VERY BASICS LIKE FOOD, HOUSING, MEDICAL CARE, AND HEATING?: NOT HARD AT ALL

## 2024-05-03 SDOH — ECONOMIC STABILITY: TRANSPORTATION INSECURITY
IN THE PAST 12 MONTHS, HAS LACK OF TRANSPORTATION KEPT YOU FROM MEETINGS, WORK, OR FROM GETTING THINGS NEEDED FOR DAILY LIVING?: NO

## 2024-05-03 ASSESSMENT — PATIENT HEALTH QUESTIONNAIRE - PHQ9
SUM OF ALL RESPONSES TO PHQ QUESTIONS 1-9: 0
2. FEELING DOWN, DEPRESSED OR HOPELESS: NOT AT ALL
1. LITTLE INTEREST OR PLEASURE IN DOING THINGS: NOT AT ALL
SUM OF ALL RESPONSES TO PHQ QUESTIONS 1-9: 0
1. LITTLE INTEREST OR PLEASURE IN DOING THINGS: NOT AT ALL
SUM OF ALL RESPONSES TO PHQ9 QUESTIONS 1 & 2: 0
2. FEELING DOWN, DEPRESSED OR HOPELESS: NOT AT ALL
SUM OF ALL RESPONSES TO PHQ9 QUESTIONS 1 & 2: 0
SUM OF ALL RESPONSES TO PHQ QUESTIONS 1-9: 0
SUM OF ALL RESPONSES TO PHQ QUESTIONS 1-9: 0

## 2024-05-03 NOTE — PROGRESS NOTES
Here for f/u of GERD issues, sx are during the day but more @ night.  Pt has not noticed any relationship to any foods.  Pt tried to adjust diet but did not make a difference.  Pt can feel reflux, no emesis.  Trying to avoid eating late.  No bowel changes, no blood in bowels.  No dysphagia.  Pt has had issues chronically but has worsened in the past few months.  Seems to be no consistency.  Pt does have hx of hiatal hernia. Pt remains on pantoprazole.  Does not feel it does much, except letting her drink coffee.  Pt did have colonoscopy but no recent EGD.     Pt is struggling with sleeping at night.  Struggles to stay asleep, but falls asleep ok.  Pt does feel that GERD can increase her sympmtoms.  Has tried benadryl and melatonin but doesn't help to keep her asleep    Pt with some discomfort behind back of L knee, occurred after spraining ankle and being less active.  Has more fullness in back of knee.  Pt can ambulate, but worse with sitting.  No instability.  Better with activity.  No trauma, injury.  No swelling.  No chest pain, shortness of breath.        Except as noted above in the history of present illness, the review of systems is  negative for headache, vision changes, chest pain, shortness of breath, abdominal pain, urinary sx, bowel changes.    Past medical, surgical, and social history reviewed and updated  Medications and allergies reviewed and updated      O: /66   Pulse 90   Temp 98.1 °F (36.7 °C) (Temporal)   Resp 14   Wt 85.8 kg (189 lb 3.2 oz)   LMP 03/13/2019 (Approximate)   SpO2 98%   BMI 32.48 kg/m²   GEN: No acute distress, cooperative, well nourished, alert.   HEENT: PEERLA, EOMI , normocephalic/atraumatic, nares and oropharynx clear.  Mucous membranes normal, Tympanic membranes clear bilaterally.  Neck: soft, supple, no thyromegaly, mass, no Lymphadenopathy  CV: Regular rate and rhythm, no murmur, rubs, gallops. No edema.  Resp: Clear to auscultation bilaterally good air entry

## 2024-05-28 ENCOUNTER — OFFICE VISIT (OUTPATIENT)
Dept: DERMATOLOGY | Age: 59
End: 2024-05-28
Payer: COMMERCIAL

## 2024-05-28 DIAGNOSIS — L82.1 SK (SEBORRHEIC KERATOSIS): ICD-10-CM

## 2024-05-28 DIAGNOSIS — Z85.828 HISTORY OF BASAL CELL CARCINOMA: ICD-10-CM

## 2024-05-28 DIAGNOSIS — L81.4 SOLAR LENTIGO: ICD-10-CM

## 2024-05-28 DIAGNOSIS — D22.9 MULTIPLE NEVI: Primary | ICD-10-CM

## 2024-05-28 DIAGNOSIS — L30.4 INTERTRIGO: ICD-10-CM

## 2024-05-28 PROCEDURE — 99213 OFFICE O/P EST LOW 20 MIN: CPT | Performed by: DERMATOLOGY

## 2024-05-28 NOTE — PROGRESS NOTES
[facility]  Star Grewal MD  [phone]      Ambar Martinez  1965    59 y.o. female     Date of Visit: 5/28/2024    Chief Complaint: follow up for skin cancers    History of Present Illness:    1.  She presents today for evaluation of multiple moles - not aware of any changes in size, color or shape.       2.  She has a stable asymptomatic growth on the upper back.    3.  She has stable freckling on the upper chest and upper extremities.    4.  She has an asymptomatic eruption under the breasts.    5.  She has a history of multiple basal cell carcinomas-denies any signs of recurrence.    Small nodular and superficial BCC of the right lower back-treated with curettage on 11/28/2023.  Small nodular and superficial BCC of the right anterior thigh-treated with curettage on 5/24/2023.  Nodular BCC of the right upper forehead-treated with Mohs by Dr. Stack on 9/27/2022.  Superficial BCC of the right clavicular region-treated with curettage on 9/24/2021.  Nodular BCC on the central forehead-treated with Mohs by Dr. Stack on 2/14/2019.  Superficial basal cell carcinoma on the right clavicular region-treated with curettage on 2/1/2019.  Nodular BCC on the right side of the forehead-treated with Mohs by Dr. Stack on 8/16/2018.  Nodular BCC on the left side of the forehead-treated with Mohs by Dr. Stack on 8/9/2018.     Has several grandchildren.       Review of Systems:  Gen: Feels well, good sense of health.    Past Medical History, Family History, Surgical History, Medications and Allergies reviewed.    Past Medical History:   Diagnosis Date    Anxiety     Arthritis     Depression     Gastroesophageal reflux disease without esophagitis 05/30/2017    Headache     History of renal insufficiency syndrome     Hypercholesterolemia     Iron deficiency anemia     Migraine with aura and without status migrainosus, not intractable 02/05/2016    Phyllodes tumor 07/2015    L breast

## 2024-06-05 ENCOUNTER — OFFICE VISIT (OUTPATIENT)
Dept: FAMILY MEDICINE CLINIC | Age: 59
End: 2024-06-05
Payer: COMMERCIAL

## 2024-06-05 VITALS
HEIGHT: 64 IN | BODY MASS INDEX: 32.06 KG/M2 | TEMPERATURE: 97.5 F | RESPIRATION RATE: 14 BRPM | DIASTOLIC BLOOD PRESSURE: 68 MMHG | WEIGHT: 187.8 LBS | OXYGEN SATURATION: 97 % | SYSTOLIC BLOOD PRESSURE: 104 MMHG | HEART RATE: 99 BPM

## 2024-06-05 DIAGNOSIS — F51.01 PRIMARY INSOMNIA: ICD-10-CM

## 2024-06-05 DIAGNOSIS — D47.3 ESSENTIAL THROMBOCYTOSIS (HCC): Primary | ICD-10-CM

## 2024-06-05 DIAGNOSIS — D45 POLYCYTHEMIA VERA (HCC): ICD-10-CM

## 2024-06-05 DIAGNOSIS — D75.839 THROMBOCYTOSIS: ICD-10-CM

## 2024-06-05 DIAGNOSIS — K21.9 GASTROESOPHAGEAL REFLUX DISEASE, UNSPECIFIED WHETHER ESOPHAGITIS PRESENT: ICD-10-CM

## 2024-06-05 DIAGNOSIS — R42 DIZZINESS: ICD-10-CM

## 2024-06-05 PROCEDURE — 99214 OFFICE O/P EST MOD 30 MIN: CPT | Performed by: FAMILY MEDICINE

## 2024-06-05 PROCEDURE — G2211 COMPLEX E/M VISIT ADD ON: HCPCS | Performed by: FAMILY MEDICINE

## 2024-06-05 RX ORDER — MECLIZINE HCL 12.5 MG/1
12.5 TABLET ORAL 3 TIMES DAILY PRN
Qty: 30 TABLET | Refills: 2 | Status: SHIPPED | OUTPATIENT
Start: 2024-06-05

## 2024-06-05 RX ORDER — EPINEPHRINE 0.3 MG/.3ML
0.3 INJECTION SUBCUTANEOUS DAILY PRN
Qty: 2 EACH | Refills: 5 | Status: SHIPPED | OUTPATIENT
Start: 2024-06-05

## 2024-06-05 NOTE — PROGRESS NOTES
Here for f/u and recheck of GERD, with change of nexium.  Pt states that she found that change of med to nexium has been doing great for her.  Pt has found some sensitivity to lactose.  No dysphagia, and no abd pain.  Bowels are fair.  Does take fiber regularly.  Pt had colonoscopy 1 year ago.  Pt was going to to have h.pylori testing but states    Pt has found that trazodone qhs and it has done great for her.  Pt trying to find the timing, it does take about 60 minutes to kick in, but has found it very effective.  Pt has noted minimal HA.  Usually takes just 1/day    Pt does have rx for meclizine, uses prn for dizziness.  Pt does have some intermittent sx, only with looking up.  Sx are true vertigo.  No presyncope and syncope.  Pt is concerned witeh some upcoming travel.  Pt will be going to [facility] and then will be gone all of August to [facility] to see family/granddaughter.      Pt continues to work with dr. Huffman for thrombocythemia and PV.  Things are stable with use of hydrea      Except as noted above in the history of present illness, the review of systems is  negative for headache, vision changes, chest pain, shortness of breath, abdominal pain, urinary sx, bowel changes.    Past medical, surgical, and social history reviewed and updated  Medications and allergies reviewed and updated        O: /68   Pulse 99   Temp 97.5 °F (36.4 °C) (Temporal)   Resp 14   Ht 1.626 m (5' 4\")   Wt 85.2 kg (187 lb 12.8 oz)   LMP 03/13/2019 (Approximate)   SpO2 97%   BMI 32.24 kg/m²   GEN: No acute distress, cooperative, well nourished, alert.   HEENT: PEERLA, EOMI , normocephalic/atraumatic, nares and oropharynx clear.  Mucous membranes normal, Tympanic membranes clear bilaterally.  Neck: soft, supple, no thyromegaly, mass, no Lymphadenopathy  CV: Regular rate and rhythm, no murmur, rubs, gallops. No edema.  Resp: Clear to auscultation bilaterally good air entry bilaterally  No crackles, wheeze. Breathing

## 2024-07-30 ENCOUNTER — OFFICE VISIT (OUTPATIENT)
Dept: FAMILY MEDICINE CLINIC | Age: 59
End: 2024-07-30
Payer: COMMERCIAL

## 2024-07-30 VITALS
SYSTOLIC BLOOD PRESSURE: 114 MMHG | BODY MASS INDEX: 32.3 KG/M2 | HEIGHT: 64 IN | TEMPERATURE: 97.3 F | HEART RATE: 74 BPM | WEIGHT: 189.2 LBS | OXYGEN SATURATION: 100 % | DIASTOLIC BLOOD PRESSURE: 68 MMHG

## 2024-07-30 DIAGNOSIS — M65.331 TRIGGER FINGER, RIGHT MIDDLE FINGER: Primary | ICD-10-CM

## 2024-07-30 DIAGNOSIS — D45 POLYCYTHEMIA VERA (HCC): ICD-10-CM

## 2024-07-30 DIAGNOSIS — F51.01 PRIMARY INSOMNIA: ICD-10-CM

## 2024-07-30 DIAGNOSIS — D47.3 ESSENTIAL THROMBOCYTOSIS (HCC): ICD-10-CM

## 2024-07-30 PROCEDURE — 99214 OFFICE O/P EST MOD 30 MIN: CPT | Performed by: FAMILY MEDICINE

## 2024-07-30 PROCEDURE — G2211 COMPLEX E/M VISIT ADD ON: HCPCS | Performed by: FAMILY MEDICINE

## 2024-07-30 NOTE — PROGRESS NOTES
Here for eval of some triggering of middle finger on R hand, present for about 6 months but feels it is doing more catching and she is having to force open in the AM.  Not as bad during the day.  Uncomfortable when it gets stuck.  Did try range of motion exercises but helping too much.      Pt was down in [facility] for 10d and was in [facility].  Weather was hot but enjoyed and she is leaving in 2 weeks to [place] for 1 month    Pt working with heme/onc for her issues of ET and PV.  They continue her on hydrea 2 tabs QD and has done well to keep her numbers stable.  Last bloodwork was 4/2024 and platelets 378 and hgb was normal at 13.8    Trazodone doing great for control of her sleep.  Using qhs, takes about 45 minutes before sleep and has helped with energy,       Except as noted above in the history of present illness, the review of systems is  negative for headache, vision changes, chest pain, shortness of breath, abdominal pain, urinary sx, bowel changes.    Past medical, surgical, and social history reviewed and updated  Medications and allergies reviewed and updated      O: /68   Pulse 74   Temp 97.3 °F (36.3 °C)   Ht 1.626 m (5' 4\")   Wt 85.8 kg (189 lb 3.2 oz)   LMP 03/13/2019 (Approximate)   SpO2 100%   BMI 32.48 kg/m²   GEN: No acute distress, cooperative, well nourished, alert.   CV: Regular rate and rhythm, no murmur, rubs, gallops. No edema.  Resp: Clear to auscultation bilaterally good air entry bilaterally  No crackles, wheeze. Breathing comfortably.   Psych: mood stable, No suicidal thoughts or ideation   Musc  full range of motion bilateral upper extremities, R 3rd digit with triggering of middle digit      Current Outpatient Medications   Medication Sig Dispense Refill    EPINEPHrine (EPIPEN 2-CRUZ) 0.3 MG/0.3ML SOAJ injection Inject 0.3 mLs into the muscle daily as needed (anaphylaxis) Use as directed for allergic reaction 2 each 5    meclizine (ANTIVERT) 12.5 MG tablet Take 1 tablet by

## 2024-09-13 ENCOUNTER — TELEPHONE (OUTPATIENT)
Dept: ADMINISTRATIVE | Age: 59
End: 2024-09-13

## 2024-09-13 NOTE — TELEPHONE ENCOUNTER
Submitted PA for Esomeprazole Magnesium 40MG dr capsules  Via CMM Key: [id number] STATUS: APPROVED   Coverage Start Date:08/14/2024  Coverage End Date:09/13/2025     If this requires a response please respond to the pool ( P [facility] MEDICATION PRE-AUTH).      Thank you please advise patient.

## 2024-10-19 RX ORDER — ESOMEPRAZOLE MAGNESIUM 40 MG/1
40 CAPSULE, DELAYED RELEASE ORAL
Qty: 30 CAPSULE | Refills: 5 | Status: SHIPPED | OUTPATIENT
Start: 2024-10-19

## 2024-10-30 ENCOUNTER — HOSPITAL ENCOUNTER (OUTPATIENT)
Dept: GENERAL RADIOLOGY | Age: 59
Discharge: HOME | End: 2024-10-30
Payer: COMMERCIAL

## 2024-10-30 ENCOUNTER — OFFICE VISIT (OUTPATIENT)
Dept: FAMILY MEDICINE CLINIC | Age: 59
End: 2024-10-30

## 2024-10-30 ENCOUNTER — HOSPITAL ENCOUNTER (OUTPATIENT)
Dept: MRI IMAGING | Age: 59
Discharge: HOME | End: 2024-10-30
Payer: COMMERCIAL

## 2024-10-30 VITALS
HEART RATE: 82 BPM | OXYGEN SATURATION: 98 % | DIASTOLIC BLOOD PRESSURE: 70 MMHG | WEIGHT: 193.4 LBS | BODY MASS INDEX: 33.02 KG/M2 | HEIGHT: 64 IN | TEMPERATURE: 96.9 F | SYSTOLIC BLOOD PRESSURE: 118 MMHG

## 2024-10-30 DIAGNOSIS — R20.0 LEFT ARM NUMBNESS: ICD-10-CM

## 2024-10-30 DIAGNOSIS — R53.1 WEAKNESS: ICD-10-CM

## 2024-10-30 DIAGNOSIS — D45 POLYCYTHEMIA VERA (HCC): Primary | ICD-10-CM

## 2024-10-30 DIAGNOSIS — R42 DIZZINESS: ICD-10-CM

## 2024-10-30 LAB
ALBUMIN SERPL-MCNC: 4.3 G/DL (ref 3.4–5)
ALBUMIN/GLOB SERPL: 1.7 {RATIO} (ref 1.1–2.2)
ALP SERPL-CCNC: 93 U/L (ref 40–129)
ALT SERPL-CCNC: 17 U/L (ref 10–40)
ANION GAP SERPL CALCULATED.3IONS-SCNC: 11 MMOL/L (ref 3–16)
AST SERPL-CCNC: 18 U/L (ref 15–37)
BASOPHILS # BLD: 0 K/UL (ref 0–0.2)
BASOPHILS NFR BLD: 0.6 %
BILIRUB SERPL-MCNC: <0.2 MG/DL (ref 0–1)
BUN SERPL-MCNC: 25 MG/DL (ref 7–20)
CALCIUM SERPL-MCNC: 9.4 MG/DL (ref 8.3–10.6)
CHLORIDE SERPL-SCNC: 103 MMOL/L (ref 99–110)
CO2 SERPL-SCNC: 27 MMOL/L (ref 21–32)
CREAT SERPL-MCNC: 0.7 MG/DL (ref 0.6–1.2)
DEPRECATED RDW RBC AUTO: 14.1 % (ref 12.4–15.4)
EOSINOPHIL # BLD: 0.2 K/UL (ref 0–0.6)
EOSINOPHIL NFR BLD: 2.6 %
ERYTHROCYTE [SEDIMENTATION RATE] IN BLOOD BY WESTERGREN METHOD: 17 MM/HR (ref 0–30)
FOLATE SERPL-MCNC: 16.8 NG/ML (ref 4.78–24.2)
GFR SERPLBLD CREATININE-BSD FMLA CKD-EPI: >90 ML/MIN/{1.73_M2}
GLUCOSE SERPL-MCNC: 91 MG/DL (ref 70–99)
HCT VFR BLD AUTO: 41.5 % (ref 36–48)
HGB BLD-MCNC: 14.3 G/DL (ref 12–16)
LYMPHOCYTES # BLD: 1.9 K/UL (ref 1–5.1)
LYMPHOCYTES NFR BLD: 29.2 %
MCH RBC QN AUTO: 34.7 PG (ref 26–34)
MCHC RBC AUTO-ENTMCNC: 34.5 G/DL (ref 31–36)
MCV RBC AUTO: 100.6 FL (ref 80–100)
MONOCYTES # BLD: 0.5 K/UL (ref 0–1.3)
MONOCYTES NFR BLD: 8.2 %
NEUTROPHILS # BLD: 3.8 K/UL (ref 1.7–7.7)
NEUTROPHILS NFR BLD: 59.4 %
PLATELET # BLD AUTO: 483 K/UL (ref 135–450)
PMV BLD AUTO: 8.6 FL (ref 5–10.5)
POTASSIUM SERPL-SCNC: 4.7 MMOL/L (ref 3.5–5.1)
PROT SERPL-MCNC: 6.8 G/DL (ref 6.4–8.2)
RBC # BLD AUTO: 4.13 M/UL (ref 4–5.2)
SODIUM SERPL-SCNC: 141 MMOL/L (ref 136–145)
T4 FREE SERPL-MCNC: 1.1 NG/DL (ref 0.9–1.8)
TSH SERPL DL<=0.005 MIU/L-ACNC: 1.04 UIU/ML (ref 0.27–4.2)
VIT B12 SERPL-MCNC: 348 PG/ML (ref 211–911)
WBC # BLD AUTO: 6.5 K/UL (ref 4–11)

## 2024-10-30 PROCEDURE — 70553 MRI BRAIN STEM W/O & W/DYE: CPT

## 2024-10-30 PROCEDURE — A9579 GAD-BASE MR CONTRAST NOS,1ML: HCPCS | Performed by: FAMILY MEDICINE

## 2024-10-30 PROCEDURE — 6360000004 HC RX CONTRAST MEDICATION: Performed by: FAMILY MEDICINE

## 2024-10-30 PROCEDURE — 72040 X-RAY EXAM NECK SPINE 2-3 VW: CPT

## 2024-10-30 RX ORDER — PREDNISONE 20 MG/1
TABLET ORAL
Qty: 18 TABLET | Refills: 0 | Status: SHIPPED | OUTPATIENT
Start: 2024-10-30 | End: 2024-11-09

## 2024-10-30 RX ADMIN — GADOTERIDOL 17 ML: 279.3 INJECTION, SOLUTION INTRAVENOUS at 16:07

## 2024-10-30 NOTE — PROGRESS NOTES
Pt states that she has noted some issues of LLE numbness/tingling, that has been present for a while.  Pt states that recently, she notice LUE developing the symptoms with numbness/tingling that comes and goes and does not linger.  Never with sx on LUE and LLE.  They do not seem to happen at the same time.  Arm is present for a few weeks.  Leg sx for a few months if not longer.      Pt noticed dizziness this past weekend, started slowly and wondered if was from COVID shot and flu shot, sx come and goes and feels some throbbing for a few second and a small waves of nausea and then does away with some mild lingering fogginess.  It can occur multiple times a day, with some mild fogginess that has continued.  Feels generalized around entire head.  No relation to position changes    No weakness in BUE, BLE.  No vision change, feels some age-related changes but no acute findings.  Does have some mild dry eyes.  No speech changes and no issues of facial tingling      Except as noted above in the history of present illness, the review of systems is  negative for headache, vision changes, chest pain, shortness of breath, abdominal pain, urinary sx, bowel changes.    Past medical, surgical, and social history reviewed and updated  Medications and allergies reviewed and updated      O: /70 (Site: Right Upper Arm, Position: Sitting, Cuff Size: Medium Adult)   Pulse 82   Temp 96.9 °F (36.1 °C) (Temporal)   Ht 1.626 m (5' 4\")   Wt 87.7 kg (193 lb 6.4 oz)   LMP 03/13/2019 (Approximate)   SpO2 98%   BMI 33.20 kg/m²   GEN: No acute distress, cooperative, well nourished, alert.   HEENT: PEERLA, EOMI , normocephalic/atraumatic, nares and oropharynx clear.  Mucous membranes normal, Tympanic membranes clear bilaterally.  Neck: soft, supple, no thyromegaly, mass, no Lymphadenopathy  CV: Regular rate and rhythm, no murmur, rubs, gallops. No edema.  Resp: Clear to auscultation bilaterally good air entry bilaterally  No

## 2024-10-31 ENCOUNTER — TELEPHONE (OUTPATIENT)
Dept: FAMILY MEDICINE CLINIC | Age: 59
End: 2024-10-31

## 2024-10-31 NOTE — TELEPHONE ENCOUNTER
Prior Authorization for medication MRI Brain w w/out contrast, has been APPROVED by insurance [employer] for time period 10/30/24-12/14/24. Please see attached scanned approval letter for more information

## 2024-11-07 ENCOUNTER — TELEPHONE (OUTPATIENT)
Dept: FAMILY MEDICINE CLINIC | Age: 59
End: 2024-11-07

## 2024-11-07 NOTE — TELEPHONE ENCOUNTER
Pt call to say that she is still experiencing numbness on her right side. She has a follow up appt on Monday 11/11/24. But wants to know is there anything she can do before that to help the numbness.

## 2024-11-08 NOTE — TELEPHONE ENCOUNTER
MA called and left vm for patient to contact us regarding the advice from Dr. Mckinley. He is asking if the steroid pack is helping at all.

## 2024-11-08 NOTE — TELEPHONE ENCOUNTER
Pt said she took all the steroids that was prescribed and it hasn't helped but she does have a appt on 11/11/24 to follow up.

## 2024-11-11 ENCOUNTER — PATIENT MESSAGE (OUTPATIENT)
Dept: FAMILY MEDICINE CLINIC | Age: 59
End: 2024-11-11

## 2024-11-11 ENCOUNTER — OFFICE VISIT (OUTPATIENT)
Dept: FAMILY MEDICINE CLINIC | Age: 59
End: 2024-11-11

## 2024-11-11 VITALS
BODY MASS INDEX: 33.64 KG/M2 | HEART RATE: 87 BPM | OXYGEN SATURATION: 99 % | DIASTOLIC BLOOD PRESSURE: 68 MMHG | WEIGHT: 196 LBS | TEMPERATURE: 96.9 F | SYSTOLIC BLOOD PRESSURE: 112 MMHG

## 2024-11-11 DIAGNOSIS — R20.0 NUMBNESS AND TINGLING: ICD-10-CM

## 2024-11-11 DIAGNOSIS — D47.3 ESSENTIAL THROMBOCYTOSIS (HCC): Primary | ICD-10-CM

## 2024-11-11 DIAGNOSIS — R42 DIZZINESS: ICD-10-CM

## 2024-11-11 DIAGNOSIS — R53.1 WEAKNESS: ICD-10-CM

## 2024-11-11 DIAGNOSIS — M50.30 DDD (DEGENERATIVE DISC DISEASE), CERVICAL: Primary | ICD-10-CM

## 2024-11-11 DIAGNOSIS — R20.2 NUMBNESS AND TINGLING: ICD-10-CM

## 2024-11-11 DIAGNOSIS — M50.30 DDD (DEGENERATIVE DISC DISEASE), CERVICAL: ICD-10-CM

## 2024-11-11 RX ORDER — GABAPENTIN 300 MG/1
300 CAPSULE ORAL 3 TIMES DAILY
Qty: 90 CAPSULE | Refills: 0 | Status: SHIPPED | OUTPATIENT
Start: 2024-11-11 | End: 2024-12-11

## 2024-11-11 NOTE — PROGRESS NOTES
BREAKFAST 30 capsule 5    EPINEPHrine (EPIPEN 2-CRUZ) 0.3 MG/0.3ML SOAJ injection Inject 0.3 mLs into the muscle daily as needed (anaphylaxis) Use as directed for allergic reaction 2 each 5    meclizine (ANTIVERT) 12.5 MG tablet Take 1 tablet by mouth 3 times daily as needed for Dizziness 30 tablet 2    traZODone (DESYREL) 50 MG tablet Take 1-2 tablets by mouth nightly 60 tablet 5    hydroxyurea (HYDREA) 500 MG chemo capsule Take 2 capsules by mouth daily      aspirin 81 MG EC tablet Take 1 tablet by mouth in the morning and at bedtime       No current facility-administered medications for this visit.           ASSESSMENT / PLAN:    1. Essential thrombocytosis (HCC)  Chronic stable sx  Cont f/u with heme/onc  Cont hydrea  bloodwork stable    2. DDD (degenerative disc disease), cervical  Chronic sx with concern for bilateral lower extremities bilateral upper extremities weakness  MRI brain nonfocal  Discussed tx options.   Check MRI brain  Gabapentin 300mg TID   F/u pending MRI results  - MRI CERVICAL SPINE WO CONTRAST; Future    3. Numbness and tingling  Progressive sx  bloodwork nonfocal  MRI brain normal  No s/s of multiple sclerosis, CVA  Check additiona bloodwork  Check MRI brain  Trial gabapentin 300mg TID  Discussed use, benefit, and side effects of prescribed medications.  Barriers to medication compliance addressed.  All patient questions answered.  Pt voiced understanding.   - Rheumatoid Factor; Future  - Cyclic Citrul Peptide Antibody, IgG; Future  - C-Reactive Protein; Future  - NAVI Reflex to Antibody Cascade; Future  - Electrophoresis Protein, Serum; Future  - MRI CERVICAL SPINE WO CONTRAST; Future    4. Dizziness  As above  - Rheumatoid Factor; Future  - Cyclic Citrul Peptide Antibody, IgG; Future  - C-Reactive Protein; Future  - NAVI Reflex to Antibody Cascade; Future  - Electrophoresis Protein, Serum; Future  - MRI CERVICAL SPINE WO CONTRAST; Future    5. Weakness  As above  - Rheumatoid Factor;

## 2024-11-12 ENCOUNTER — HOSPITAL ENCOUNTER (OUTPATIENT)
Dept: MRI IMAGING | Age: 59
Discharge: HOME | End: 2024-11-12
Payer: COMMERCIAL

## 2024-11-12 DIAGNOSIS — M50.30 DDD (DEGENERATIVE DISC DISEASE), CERVICAL: ICD-10-CM

## 2024-11-12 DIAGNOSIS — R53.1 WEAKNESS: ICD-10-CM

## 2024-11-12 DIAGNOSIS — R20.2 NUMBNESS AND TINGLING: ICD-10-CM

## 2024-11-12 DIAGNOSIS — R20.0 NUMBNESS AND TINGLING: ICD-10-CM

## 2024-11-12 DIAGNOSIS — R42 DIZZINESS: ICD-10-CM

## 2024-11-12 LAB
ANA SER QL IA: NEGATIVE
CCP IGG SERPL-ACNC: <0.5 U/ML (ref 0–2.9)
CRP SERPL-MCNC: 3.2 MG/L (ref 0–5.1)
RHEUMATOID FACT SER IA-ACNC: <10 IU/ML

## 2024-11-12 PROCEDURE — 72141 MRI NECK SPINE W/O DYE: CPT

## 2024-11-13 ENCOUNTER — TELEPHONE (OUTPATIENT)
Dept: FAMILY MEDICINE CLINIC | Age: 59
End: 2024-11-13

## 2024-11-13 LAB
IGA SERPL-MCNC: 83.4 MG/DL (ref 70–400)
IGG SERPL-MCNC: 1331 MG/DL (ref 700–1600)
IGM SERPL-MCNC: 72.5 MG/DL (ref 40–230)
SPE/IFE INTERPRETATION: NORMAL

## 2024-11-13 NOTE — TELEPHONE ENCOUNTER
Prior Authorization for MRI CERVICAL SPINE has been APPROVED by [facility] for time period 11/12/2024 through 12/27/2024. Please see attached scanned approval letter for more information

## 2024-11-14 LAB
ALBUMIN SERPL ELPH-MCNC: 3 G/DL (ref 3.1–4.9)
ALPHA1 GLOB SERPL ELPH-MCNC: 0.3 G/DL (ref 0.2–0.4)
ALPHA2 GLOB SERPL ELPH-MCNC: 0.9 G/DL (ref 0.4–1.1)
B-GLOBULIN SERPL ELPH-MCNC: 0.9 G/DL (ref 0.9–1.6)
GAMMA GLOB SERPL ELPH-MCNC: 1.3 G/DL (ref 0.6–1.8)
M PROTEIN 1 SERPL ELPH-MCNC: 0.5 G/DL
PROT SERPL-MCNC: 6.4 G/DL (ref 6.4–8.2)

## 2024-11-27 ENCOUNTER — TELEPHONE (OUTPATIENT)
Dept: FAMILY MEDICINE CLINIC | Age: 59
End: 2024-11-27

## 2024-11-27 DIAGNOSIS — M50.30 DDD (DEGENERATIVE DISC DISEASE), CERVICAL: Primary | ICD-10-CM

## 2024-11-27 NOTE — TELEPHONE ENCOUNTER
Patient called stating the orthopaedic doctor that Dr. Mckinley referred her to is not in network with her insurance. She is requesting another doctor in [facility] or [facility]. Please contact patient when new referral is in the system, thanks.    [phone] (home) [phone] (work)

## 2024-12-02 NOTE — TELEPHONE ENCOUNTER
MA left msg on vm regarding the referral to Dr. Saeid Hillman with the phone number of  [phone]

## 2024-12-04 ENCOUNTER — OFFICE VISIT (OUTPATIENT)
Dept: DERMATOLOGY | Age: 59
End: 2024-12-04
Payer: COMMERCIAL

## 2024-12-04 DIAGNOSIS — L30.4 INTERTRIGO: ICD-10-CM

## 2024-12-04 DIAGNOSIS — L82.1 SK (SEBORRHEIC KERATOSIS): ICD-10-CM

## 2024-12-04 DIAGNOSIS — Z85.828 HISTORY OF BASAL CELL CARCINOMA: ICD-10-CM

## 2024-12-04 DIAGNOSIS — L57.0 ACTINIC KERATOSIS: ICD-10-CM

## 2024-12-04 DIAGNOSIS — D22.9 MULTIPLE NEVI: Primary | ICD-10-CM

## 2024-12-04 PROCEDURE — 99213 OFFICE O/P EST LOW 20 MIN: CPT | Performed by: DERMATOLOGY

## 2024-12-04 PROCEDURE — 17000 DESTRUCT PREMALG LESION: CPT | Performed by: DERMATOLOGY

## 2024-12-04 PROCEDURE — 17003 DESTRUCT PREMALG LES 2-14: CPT | Performed by: DERMATOLOGY

## 2024-12-04 NOTE — PROGRESS NOTES
Assessment and Plan     1. Multiple nevi - benign appearing    Sun protective behaviors, including use of at least SPF 30 sunscreen, and self skin examinations were encouraged.  Call for any new or concerning lesions.       2. SK (seborrheic keratosis)     Reassurance.      3. Actinic keratosis - 6    Cryotherapy was discussed and patient agreed to proceed.  Consent was obtained.  6 lesions were treated cryotherapy: Right medial cheek - 2, right nasal dorsum - 2, left malar cheek - 2. 2 cycles of liquid nitrogen applied to each lesion for 5 seconds using a Cry-Ac cryo spray gun.  Patient was educated regarding the potential risks of blister formation and discomfort.  Wound care was discussed.  The patient tolerated the procedure well and there were no immediate complications.      4. Intertrigo     Zinc oxide barrier cream or Cavilon durable barrier cream daily.      5. History of basal cell carcinomas - clear    Sun protective behaviors, including use of at least SPF 30 sunscreen, and self skin examinations were encouraged.  Call for any new or concerning lesions.            Return in about 6 months (around 6/4/2025).    --Star Grewal MD

## 2024-12-05 RX ORDER — GABAPENTIN 300 MG/1
300 CAPSULE ORAL 3 TIMES DAILY
Qty: 90 CAPSULE | Refills: 2 | Status: SHIPPED | OUTPATIENT
Start: 2024-12-05 | End: 2025-03-05

## 2024-12-30 RX ORDER — TRAZODONE HYDROCHLORIDE 50 MG/1
50-100 TABLET, FILM COATED ORAL NIGHTLY
Qty: 60 TABLET | Refills: 0 | Status: SHIPPED | OUTPATIENT
Start: 2024-12-30

## 2024-12-30 NOTE — TELEPHONE ENCOUNTER
Medication:   Requested Prescriptions     Pending Prescriptions Disp Refills    traZODone (DESYREL) 50 MG tablet 60 tablet 5     Sig: Take 1-2 tablets by mouth nightly        Last Filled:      Patient Phone Number: [phone] (home) [phone] (work)    Last appt: 11/11/2024   Next appt: 1/21/2025    Last OARRS:       9/25/2018     8:11 AM   RX Monitoring   Attestation The Prescription Monitoring Report for this patient was reviewed today.   Periodic Controlled Substance Monitoring Possible medication side effects, risk of tolerance/dependence & alternative treatments discussed.;No signs of potential drug abuse or diversion identified.

## 2025-01-21 ENCOUNTER — OFFICE VISIT (OUTPATIENT)
Dept: FAMILY MEDICINE CLINIC | Age: 60
End: 2025-01-21

## 2025-01-21 VITALS
BODY MASS INDEX: 35.13 KG/M2 | SYSTOLIC BLOOD PRESSURE: 122 MMHG | HEART RATE: 66 BPM | HEIGHT: 64 IN | RESPIRATION RATE: 16 BRPM | DIASTOLIC BLOOD PRESSURE: 78 MMHG | WEIGHT: 205.8 LBS | TEMPERATURE: 96.8 F | OXYGEN SATURATION: 98 %

## 2025-01-21 DIAGNOSIS — D45 POLYCYTHEMIA VERA (HCC): ICD-10-CM

## 2025-01-21 DIAGNOSIS — R20.0 LEFT ARM NUMBNESS: ICD-10-CM

## 2025-01-21 DIAGNOSIS — Z01.810 PREOP CARDIOVASCULAR EXAM: Primary | ICD-10-CM

## 2025-01-21 DIAGNOSIS — D47.3 ESSENTIAL THROMBOCYTOSIS (HCC): ICD-10-CM

## 2025-01-21 DIAGNOSIS — R20.0 NUMBNESS AND TINGLING: ICD-10-CM

## 2025-01-21 DIAGNOSIS — Z01.810 PREOP CARDIOVASCULAR EXAM: ICD-10-CM

## 2025-01-21 DIAGNOSIS — F51.01 PRIMARY INSOMNIA: ICD-10-CM

## 2025-01-21 DIAGNOSIS — M50.30 DDD (DEGENERATIVE DISC DISEASE), CERVICAL: ICD-10-CM

## 2025-01-21 DIAGNOSIS — R20.2 NUMBNESS AND TINGLING: ICD-10-CM

## 2025-01-21 LAB
INR PPP: 0.99 (ref 0.85–1.15)
PROTHROMBIN TIME: 13.3 SEC (ref 11.9–14.9)

## 2025-01-21 SDOH — ECONOMIC STABILITY: FOOD INSECURITY: WITHIN THE PAST 12 MONTHS, THE FOOD YOU BOUGHT JUST DIDN'T LAST AND YOU DIDN'T HAVE MONEY TO GET MORE.: NEVER TRUE

## 2025-01-21 SDOH — ECONOMIC STABILITY: FOOD INSECURITY: WITHIN THE PAST 12 MONTHS, YOU WORRIED THAT YOUR FOOD WOULD RUN OUT BEFORE YOU GOT MONEY TO BUY MORE.: NEVER TRUE

## 2025-01-21 ASSESSMENT — PATIENT HEALTH QUESTIONNAIRE - PHQ9
1. LITTLE INTEREST OR PLEASURE IN DOING THINGS: NOT AT ALL
SUM OF ALL RESPONSES TO PHQ QUESTIONS 1-9: 0
2. FEELING DOWN, DEPRESSED OR HOPELESS: NOT AT ALL
SUM OF ALL RESPONSES TO PHQ QUESTIONS 1-9: 0
SUM OF ALL RESPONSES TO PHQ9 QUESTIONS 1 & 2: 0

## 2025-01-21 NOTE — PROGRESS NOTES
Future    2. DDD (degenerative disc disease), cervical  Set for neck surgery as above  Medically cleared for surgery.   Check bloodwork  EKG normal  - CBC with Auto Differential; Future  - Basic Metabolic Panel; Future  - Protime-INR; Future  - Hemoglobin A1C; Future    3. Numbness and tingling  D/t degenerative disc disease cervical spine  Monitor with upcoming surgery    4. Essential thrombocytosis (HCC)  Stable w/o flare  Cont f/u with heme/onc  Cont hydrea    5. Polycythemia vera (HCC)  Cont hydrea and f/u with heme/onc    6. Left arm numbness  D/t cervical degenerative disc disease  Monitor with upcoming surgery    7. Primary insomnia  Cont supportive therapy, trazodone qhs           Follow-up appointment:   After surgery  Prn     Discussed use, benefit, and side effects of all prescribed medications.  Barriers to medication compliance addressed.  All patient questions answered.  Pt voiced understanding.  When applicable, patient's outside records were reviewed through Care Everywhere.  The patient has signed appropriate paperworks/consents.       Per encounter diagnoses   She is medically cleared for surgery and anesthesia. Avoid Aspirin, non steroidal anti inflammatory medications, including Motrin, Aleve, Ibuprofen, Advil; multi vitamins, Vitamin E, omega 3 fish oil, and glucosamine chondroitin for the 7 days prior to surgery.

## 2025-01-22 LAB
ANION GAP SERPL CALCULATED.3IONS-SCNC: 8 MMOL/L (ref 3–16)
BASOPHILS # BLD: 0 K/UL (ref 0–0.2)
BASOPHILS NFR BLD: 0.8 %
BUN SERPL-MCNC: 13 MG/DL (ref 7–20)
CALCIUM SERPL-MCNC: 9.4 MG/DL (ref 8.3–10.6)
CHLORIDE SERPL-SCNC: 103 MMOL/L (ref 99–110)
CO2 SERPL-SCNC: 28 MMOL/L (ref 21–32)
CREAT SERPL-MCNC: 0.7 MG/DL (ref 0.6–1.2)
DEPRECATED RDW RBC AUTO: 15 % (ref 12.4–15.4)
EOSINOPHIL # BLD: 0.1 K/UL (ref 0–0.6)
EOSINOPHIL NFR BLD: 2.5 %
EST. AVERAGE GLUCOSE BLD GHB EST-MCNC: 105.4 MG/DL
GFR SERPLBLD CREATININE-BSD FMLA CKD-EPI: >90 ML/MIN/{1.73_M2}
GLUCOSE SERPL-MCNC: 89 MG/DL (ref 70–99)
HBA1C MFR BLD: 5.3 %
HCT VFR BLD AUTO: 41.3 % (ref 36–48)
HGB BLD-MCNC: 14.1 G/DL (ref 12–16)
LYMPHOCYTES # BLD: 1.5 K/UL (ref 1–5.1)
LYMPHOCYTES NFR BLD: 30.1 %
MCH RBC QN AUTO: 35 PG (ref 26–34)
MCHC RBC AUTO-ENTMCNC: 34.2 G/DL (ref 31–36)
MCV RBC AUTO: 102.4 FL (ref 80–100)
MONOCYTES # BLD: 0.3 K/UL (ref 0–1.3)
MONOCYTES NFR BLD: 6.5 %
NEUTROPHILS # BLD: 3 K/UL (ref 1.7–7.7)
NEUTROPHILS NFR BLD: 60.1 %
PLATELET # BLD AUTO: 326 K/UL (ref 135–450)
PMV BLD AUTO: 8.1 FL (ref 5–10.5)
POTASSIUM SERPL-SCNC: 4.4 MMOL/L (ref 3.5–5.1)
RBC # BLD AUTO: 4.03 M/UL (ref 4–5.2)
SODIUM SERPL-SCNC: 139 MMOL/L (ref 136–145)
WBC # BLD AUTO: 5.1 K/UL (ref 4–11)

## 2025-01-29 RX ORDER — TRAZODONE HYDROCHLORIDE 50 MG/1
TABLET, FILM COATED ORAL
Qty: 60 TABLET | Refills: 0 | Status: SHIPPED | OUTPATIENT
Start: 2025-01-29

## 2025-03-03 RX ORDER — TRAZODONE HYDROCHLORIDE 50 MG/1
TABLET ORAL
Qty: 60 TABLET | Refills: 0 | Status: SHIPPED | OUTPATIENT
Start: 2025-03-03

## 2025-03-06 RX ORDER — TRAZODONE HYDROCHLORIDE 50 MG/1
TABLET ORAL
Qty: 60 TABLET | Refills: 0 | OUTPATIENT
Start: 2025-03-06

## 2025-03-11 RX ORDER — GABAPENTIN 300 MG/1
300 CAPSULE ORAL 3 TIMES DAILY
Qty: 90 CAPSULE | Refills: 5 | Status: SHIPPED | OUTPATIENT
Start: 2025-03-11 | End: 2025-09-07

## 2025-03-26 ENCOUNTER — OFFICE VISIT (OUTPATIENT)
Dept: SURGERY | Age: 60
End: 2025-03-26
Payer: COMMERCIAL

## 2025-03-26 VITALS
HEART RATE: 92 BPM | DIASTOLIC BLOOD PRESSURE: 81 MMHG | HEIGHT: 64 IN | BODY MASS INDEX: 34.08 KG/M2 | WEIGHT: 199.6 LBS | SYSTOLIC BLOOD PRESSURE: 126 MMHG

## 2025-03-26 DIAGNOSIS — N62 GIGANTOMASTIA: ICD-10-CM

## 2025-03-26 DIAGNOSIS — D48.60 PHYLLODES NEOPLASM OF BREAST: Primary | ICD-10-CM

## 2025-03-26 PROCEDURE — 99204 OFFICE O/P NEW MOD 45 MIN: CPT | Performed by: SURGERY

## 2025-03-26 RX ORDER — SODIUM CHLORIDE 0.9 % (FLUSH) 0.9 %
5-40 SYRINGE (ML) INJECTION EVERY 12 HOURS SCHEDULED
OUTPATIENT
Start: 2025-03-26

## 2025-03-26 RX ORDER — SODIUM CHLORIDE, SODIUM LACTATE, POTASSIUM CHLORIDE, CALCIUM CHLORIDE 600; 310; 30; 20 MG/100ML; MG/100ML; MG/100ML; MG/100ML
INJECTION, SOLUTION INTRAVENOUS CONTINUOUS
OUTPATIENT
Start: 2025-03-26

## 2025-03-26 RX ORDER — SODIUM CHLORIDE 0.9 % (FLUSH) 0.9 %
5-40 SYRINGE (ML) INJECTION PRN
OUTPATIENT
Start: 2025-03-26

## 2025-03-26 RX ORDER — SODIUM CHLORIDE 9 MG/ML
INJECTION, SOLUTION INTRAVENOUS PRN
OUTPATIENT
Start: 2025-03-26

## 2025-03-26 NOTE — PROGRESS NOTES
[facility]    CC: Symptomatic macromastia    REFERRING PHYSICIAN: Elizabeth Fry    HPI: This is a 59 y.o.  female who presents to clinic with desire for breast reduction. She underwent an ACDF and notes that she has been having worsening upper back pain from her breasts - who her surgeons advised to have her breasts reduced. She has a history of multiple left breast biopsies and lumpectomy for a phyllodes tumor. Her pertinent breast history include the following:    Last Mammogram:     Current bra size: 36-38J  Desired bra size: As small as possible  Pregnancies/miscarriages:   Breast feeding: no future plans    Breast Symptoms:    Macromastia Symptoms:  Upper back pain: Yes      Bra strap grooves: Yes      Wears supportive bras (>1 yr): Yes      Tried conservative measures (PT, MDs,etc): No      Intertrigo: Yes      Head/neck pain: Yes      Headaches: Yes      Paresthesias of hands/fingers: Yes      PMHx:   Past Medical History:   Diagnosis Date    Anxiety     Arthritis     DDD (degenerative disc disease), cervical     Depression     Gastroesophageal reflux disease without esophagitis 2017    Headache     History of renal insufficiency syndrome     Hypercholesterolemia     Iron deficiency anemia     Migraine with aura and without status migrainosus, not intractable 2016    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

## 2025-03-27 ENCOUNTER — TELEPHONE (OUTPATIENT)
Dept: SURGERY | Age: 60
End: 2025-03-27

## 2025-03-27 NOTE — TELEPHONE ENCOUNTER
The patient was in the office to see Dr. Berry yesterday.    PLAN:Would benefit from breast amputation with free nipple grafting given her significant breast size. We discussed the differences between a standard reduction and amputation and she understands and desires to proceed.  Will submit to insurance and obtain clearances, and then schedule.     I received a surgery letter.     I lmom for the patient at the home number listed. I requested a call back to discuss needed medical records.     Needs clearance including oncology.    I will leave this phone note open.

## 2025-04-01 NOTE — TELEPHONE ENCOUNTER
The patient returned my call mentioned below to discuss needed medical records. The patient spinal surgery in January (Orthopaedic) based on nerve pain. The patient that her PCP ([facility]) sent her for imaging. The patient stated that Dr. Fry has documented intertrigo and removed lumps. The patient also stated that her mother has breast cancer.  The patient was provided the office fax number and will work to have her OP notes sent to the office. She is aware that once I receive it I will reach to her.     I will leave this phone note open.

## 2025-04-02 NOTE — TELEPHONE ENCOUNTER
I received medical records from [facility] Dr. Fry. The records are in my drawer.     I will leave this phone note open.

## 2025-04-03 NOTE — TELEPHONE ENCOUNTER
I received additional medical records and a recent mammogram result. I spoke with the patient to discuss the records that we received and provide an insurance update.     I will submit clinicals and colored pictures to [facility] today. The information that I submitted are saved on my computer.    Tracking #[id number]     I will leave this phone note open.

## 2025-04-04 RX ORDER — TRAZODONE HYDROCHLORIDE 50 MG/1
100 TABLET ORAL NIGHTLY
Qty: 60 TABLET | Refills: 5 | Status: SHIPPED | OUTPATIENT
Start: 2025-04-04

## 2025-04-11 ENCOUNTER — PREP FOR PROCEDURE (OUTPATIENT)
Dept: SURGERY | Age: 60
End: 2025-04-11

## 2025-04-11 DIAGNOSIS — N62 GYNECOMASTIA: ICD-10-CM

## 2025-04-11 NOTE — TELEPHONE ENCOUNTER
I received a letter from [employer] dated 4-7-2025. I will scan the letter into Epic under the media tab.    APPROVED  Case Number [id number]  Jamal  CPT Code 89427,70712  Date Range 4- to 10-8-2025    I spoke with the patient at the home number listed to discuss insurance and surgery scheduling. We discussed a possible surgery date of either 5-1-2025 or 5-2-2025. I will send an email to [facility] to see if either is available.     I will leave this phone note open.

## 2025-04-11 NOTE — TELEPHONE ENCOUNTER
I spoke with the patient at the home number listed. The patient is now scheduled for surgery with  on 5-1-2025.     The patient is aware of H&P.    The patient is scheduled for her post op appointment 5-8-2025.    I will submit the case request to [facility] today.     I will email the surgery information and instructions to the patient today [email or web address].     I will close this phone note.

## 2025-04-23 ENCOUNTER — OFFICE VISIT (OUTPATIENT)
Dept: SURGERY | Age: 60
End: 2025-04-23

## 2025-04-23 DIAGNOSIS — Z09 POSTOP CHECK: Primary | ICD-10-CM

## 2025-04-23 PROCEDURE — 99024 POSTOP FOLLOW-UP VISIT: CPT

## 2025-04-23 NOTE — PROGRESS NOTES
Musculoskeletal: See HPI.  Skin: Negative for color change, pallor and rash.   Endocrine: negative for tremors, temperature intolerance or polydipsia.  Allergic/Immunologic: Negative for new environmental or food allergies.  Neurological: See HPI.  Hematological: Negative for adenopathy.   Psychiatric/Behavioral: Negative for agitation and confusion.      EXAM   WT: 199 lb HT: 5f4in Resp 16    GEN: NAD, pleasant, obese  CVS: RRR  PULM: No respiratory distress  HEENT: PERRLA/EOMI; wearing mask hearing appears within normal limits  NECK: Supple with trachea in midline, no masses  EXT: No lymphedema noted  ABD: soft/NT/obese  NEURO: No focal deficits, no obvious CN deficits  BACK: Bilateral latiss muscle intact  BREAST: Right larger than Left   R  Ptosis ndgndrndanddndend:nd nd2nd Palpable masses: No     Nipple retraction: No     Palpable axillary lymphadenopathy: No     SN-N: 38 cm     N-IMF: 21 cm     Breast width: 17 cm         L  Ptosis ndgndrndanddndend:nd nd2nd Palpable masses: No     Nipple retraction: No     Palpable axillary lymphadenopathy: No     SN-N: 40 cm     N-IMF: 20 cm     Breast width:17.4 cm       Estimated Reduction Volume (Schnur scale): 650 grams (each)    RADIOLOGY: Reviewed    IMP: 60 y.o. female with symptomatic macromastia  PLAN: We discussed in depth pre-op and post op instructions and surgical plan. We discussed she is to stop all supplements and NSAIDS 7 days prior to surgery. We discussed stopping aspirin and hydroxyurea 7 days prior. Her hematology/oncology is in agreement she is safe to stop for surgery. We discussed the need to optimize protein to  grams per day following surgery. We discussed restrictions are to be in place 6 weeks which includes no lifting over 5 pounds, no pulling or pushing. No sweeping or running vacuum. We discussed PHYLICIA drain care and potential for wound vac placement. We discussed she would have the breast drains for one week. We discussed the nipple grafts and the need for bolster

## 2025-04-24 RX ORDER — ONDANSETRON 4 MG/1
4 TABLET, ORALLY DISINTEGRATING ORAL 3 TIMES DAILY PRN
Qty: 9 TABLET | Refills: 0 | Status: SHIPPED | OUTPATIENT
Start: 2025-04-24

## 2025-04-28 NOTE — PROGRESS NOTES
4/28 @ 0900 Pt confirms will be seeing PCP Dr. Mckinley [phone] on 4/29 for PreOp/Testing. MD

## 2025-04-28 NOTE — PROGRESS NOTES
eating/ drinking as you will have very specific instructions to follow.  If you did not receive these, call your surgeon's office immediately.     5. MEDICATIONS:  Take the following medications with a SMALL sip of water: NEXIUM  Use your usual dose of inhalers the morning of surgery. BRING your rescue inhaler with you to hospital.   Anesthesia does NOT want you to take insulin the morning of surgery. They will control your blood sugar while you are at the hospital. Please contact your ordering physician for instructions regarding your insulin the night before your procedure. If you have an insulin pump, please keep it set on basal rate.   Bariatric patient's call your surgeon if on diabetic medications as some may need to be stopped 1 week prior to surgery    6. Do not swallow additional water when brushing teeth. No gum, candy, mints, or ice chips. Refrain from smoking or at least decrease the amount on day of surgery.    7. Morning of surgery:   Take a shower with an antibacterial soap (i.e., Safeguard or Dial) OR your physician may have instructed you to use Hibiclens.  Dress in loose, comfortable clothing appropriate for redressing after your procedure.   Do not wear jewelry (including body piercings), make-up (especially NO eye make-up), fingernail polish (NO toenail polish if foot/leg surgery), lotion, powders, or metal hairclips.   Do not shave or wax for 72 hours prior to procedure near your operative site. Shaving with a razor can irritate your skin and make it easier to develop an infection. On the day of your procedure, any hair that needs to be removed near the surgical site will be 'clipped' by a healthcare worker using a special clipper designed to avoid skin irritation.    8. Dentures, glasses, or contacts will need to be removed before your procedure. Bring cases for your glasses, contacts, dentures, or hearing aids to protect them while you are in surgery.      9. If you use a CPAP, please bring it  extreme drowsiness, changes in your vital signs or breathing patterns. Nausea, headache, muscle aches, or sore throat may also occur after anesthesia.  Your nurse will help you manage these potential side effects.    2. For comfort and safety, arrange to have someone at home with you for the first 24 hours after discharge.    3. You and your family will be given written instructions about your diet, activity, dressing care, medications, and return visits.     4. Once at home, should issues with nausea, pain, or bleeding occur, or should you notice any signs of infection, you should call your surgeon.    5. Always clean your hands before and after caring for your wound. Do not let your family touch your surgery site without cleaning their hands.     6. Narcotic pain medications can cause significant constipation.  You may want to add a stool softener to your postoperative medication schedule or speak to your surgeon on how best to manage this SIDE EFFECT.    SPECIAL INSTRUCTIONS     Thank you for allowing us to care for you.  We strive to exceed your expectations in the delivery of care and service provided to you and your family.     If you need to contact the Pre-Admission Testing staff for any reason, please call us at [phone]    Instructions reviewed with patient during preadmission testing phone interview.  Palma Zavala RN.4/28/2025 .9:00 AM      ADDITIONAL EDUCATIONAL INFORMATION REVIEWED PER PHONE WITH YOU AND/OR YOUR FAMILY:  No Hibiclens® Bathing Instructions   Yes Antibacterial Soap

## 2025-04-29 ENCOUNTER — OFFICE VISIT (OUTPATIENT)
Dept: FAMILY MEDICINE CLINIC | Age: 60
End: 2025-04-29
Payer: COMMERCIAL

## 2025-04-29 VITALS
DIASTOLIC BLOOD PRESSURE: 70 MMHG | BODY MASS INDEX: 32.44 KG/M2 | WEIGHT: 190 LBS | HEIGHT: 64 IN | SYSTOLIC BLOOD PRESSURE: 120 MMHG

## 2025-04-29 DIAGNOSIS — D75.839 THROMBOCYTOSIS: ICD-10-CM

## 2025-04-29 DIAGNOSIS — N62 MACROMASTIA: ICD-10-CM

## 2025-04-29 DIAGNOSIS — D45 POLYCYTHEMIA VERA (HCC): ICD-10-CM

## 2025-04-29 DIAGNOSIS — E78.00 HYPERCHOLESTEROLEMIA: ICD-10-CM

## 2025-04-29 DIAGNOSIS — M50.30 DDD (DEGENERATIVE DISC DISEASE), CERVICAL: ICD-10-CM

## 2025-04-29 DIAGNOSIS — Z01.810 PREOP CARDIOVASCULAR EXAM: Primary | ICD-10-CM

## 2025-04-29 DIAGNOSIS — Z01.810 PREOP CARDIOVASCULAR EXAM: ICD-10-CM

## 2025-04-29 LAB
APTT BLD: 28.3 SEC (ref 22.1–36.4)
INR PPP: 1.03 (ref 0.85–1.15)
PROTHROMBIN TIME: 13.8 SEC (ref 11.9–14.9)

## 2025-04-29 PROCEDURE — 99214 OFFICE O/P EST MOD 30 MIN: CPT | Performed by: FAMILY MEDICINE

## 2025-04-29 PROCEDURE — G2211 COMPLEX E/M VISIT ADD ON: HCPCS | Performed by: FAMILY MEDICINE

## 2025-04-29 NOTE — H&P (VIEW-ONLY)
Subjective:    Chief Complaint:     Ambar Martinez is a 60 y.o. female who presents for a preoperative physical examination.  She is scheduled to have breast reduction surgery  done by Dr. Berry at [facility] on 5/1/2025.  Pt has dealt with chronic issues related to large breasts, including hx of abnormal mammograms, chronic back pain issues .  Due to persistent issues, pt is set for bilateral breast surgery with nipple grafting as above.  Surgery is outpatient.  Pt will have drains for 1 week, and will be limited for 6 weeks.      Pt continues to follow with Heme/onc for PV. Pt remains on hydrea and overall that seems to be doing well for her.  Continues with serial bloodwork and monitoring, and dr. Huffman is ok with current plan.  Pt will stop hydrea and ASA for the surgery as above    Pt is about 3 months out from her cervical neck surgery and overall has been happy with the surgery.  range of motion is doing well and happy with benefit.  Strength in bilateral upper extremities is normal, and improving.  Never did physical therapy but doing well    History of Present Illness:          Past Medical History:   Diagnosis Date    Anxiety     Arthritis     DDD (degenerative disc disease), cervical     Depression     Essential thrombocythemia (HCC)     Gastroesophageal reflux disease without esophagitis 05/30/2017    Gigantomastia     Headache     History of renal insufficiency syndrome     Hypercholesterolemia     Iron deficiency anemia     Migraine with aura and without status migrainosus, not intractable 02/05/2016    Phyllodes tumor 07/2015    L breast    Polycythemia vera(238.4)     PONV (postoperative nausea and vomiting)     Reactive airway disease 01/29/2018    Thrombocytosis         Review of patient's past surgical history indicates:     Past Surgical History:   Procedure Laterality Date    BREAST BIOPSY  11/2011    bilateral  X 5 - benign    BREAST BIOPSY Bilateral 8/2015    phyllodes tumor Left    BREAST BIOPSY

## 2025-04-29 NOTE — PROGRESS NOTES
cleared for surgery.    - CBC with Auto Differential; Future  - Protime-INR; Future  - APTT; Future  - Comprehensive Metabolic Panel; Future  - Lipid Panel; Future    2. Macromastia  Chronic issues with chronic neck pain, hx of multiple biopsies  Set for surgery  Medically cleared for surgery.    - CBC with Auto Differential; Future  - Protime-INR; Future  - APTT; Future  - Comprehensive Metabolic Panel; Future  - Lipid Panel; Future    3. Polycythemia vera (HCC)  Cont hydrea  On hold for 1 week for surgery  Resume after surgery    4. Thrombocytosis  D/t PV  Check f/u bloodwork   - CBC with Auto Differential; Future  - Protime-INR; Future  - APTT; Future  - Comprehensive Metabolic Panel; Future  - Lipid Panel; Future    5. DDD (degenerative disc disease), cervical  Doing great s/p surgery  Cont supportive therapy    6. Hypercholesterolemia  Check cmp, lipids             Follow-up appointment:   After surgery  Pending bloodwork   Prn     Discussed use, benefit, and side effects of all prescribed medications.  Barriers to medication compliance addressed.  All patient questions answered.  Pt voiced understanding.  When applicable, patient's outside records were reviewed through Care Everywhere.  The patient has signed appropriate paperworks/consents.         Per encounter diagnoses   She is medically cleared for surgery and anesthesia. Avoid Aspirin, non steroidal anti inflammatory medications, including Motrin, Aleve, Ibuprofen, Advil; multi vitamins, Vitamin E, omega 3 fish oil, and glucosamine chondroitin for the 7 days prior to surgery.

## 2025-04-30 ENCOUNTER — ANESTHESIA EVENT (OUTPATIENT)
Dept: OPERATING ROOM | Age: 60
End: 2025-04-30
Payer: COMMERCIAL

## 2025-04-30 ENCOUNTER — RESULTS FOLLOW-UP (OUTPATIENT)
Dept: FAMILY MEDICINE CLINIC | Age: 60
End: 2025-04-30

## 2025-04-30 LAB
ALBUMIN SERPL-MCNC: 4.4 G/DL (ref 3.4–5)
ALBUMIN/GLOB SERPL: 1.7 {RATIO} (ref 1.1–2.2)
ALP SERPL-CCNC: 115 U/L (ref 40–129)
ALT SERPL-CCNC: 27 U/L (ref 10–40)
ANION GAP SERPL CALCULATED.3IONS-SCNC: 11 MMOL/L (ref 3–16)
AST SERPL-CCNC: 25 U/L (ref 15–37)
BASOPHILS # BLD: 0.1 K/UL (ref 0–0.2)
BASOPHILS NFR BLD: 1.3 %
BILIRUB SERPL-MCNC: 0.4 MG/DL (ref 0–1)
BUN SERPL-MCNC: 9 MG/DL (ref 7–20)
CALCIUM SERPL-MCNC: 9.6 MG/DL (ref 8.3–10.6)
CHLORIDE SERPL-SCNC: 102 MMOL/L (ref 99–110)
CHOLEST SERPL-MCNC: 293 MG/DL (ref 0–199)
CO2 SERPL-SCNC: 26 MMOL/L (ref 21–32)
CREAT SERPL-MCNC: 0.8 MG/DL (ref 0.6–1.2)
DEPRECATED RDW RBC AUTO: 14.2 % (ref 12.4–15.4)
EOSINOPHIL # BLD: 0.2 K/UL (ref 0–0.6)
EOSINOPHIL NFR BLD: 3.3 %
GFR SERPLBLD CREATININE-BSD FMLA CKD-EPI: 83 ML/MIN/{1.73_M2}
GLUCOSE SERPL-MCNC: 99 MG/DL (ref 70–99)
HCT VFR BLD AUTO: 40.7 % (ref 36–48)
HDLC SERPL-MCNC: 57 MG/DL (ref 40–60)
HGB BLD-MCNC: 14.1 G/DL (ref 12–16)
LDLC SERPL CALC-MCNC: 209 MG/DL
LYMPHOCYTES # BLD: 1.5 K/UL (ref 1–5.1)
LYMPHOCYTES NFR BLD: 29 %
MCH RBC QN AUTO: 34 PG (ref 26–34)
MCHC RBC AUTO-ENTMCNC: 34.6 G/DL (ref 31–36)
MCV RBC AUTO: 98.1 FL (ref 80–100)
MONOCYTES # BLD: 0.5 K/UL (ref 0–1.3)
MONOCYTES NFR BLD: 10.3 %
NEUTROPHILS # BLD: 3 K/UL (ref 1.7–7.7)
NEUTROPHILS NFR BLD: 56.1 %
PLATELET # BLD AUTO: 427 K/UL (ref 135–450)
PMV BLD AUTO: 8.6 FL (ref 5–10.5)
POTASSIUM SERPL-SCNC: 5.1 MMOL/L (ref 3.5–5.1)
PROT SERPL-MCNC: 7 G/DL (ref 6.4–8.2)
RBC # BLD AUTO: 4.15 M/UL (ref 4–5.2)
SODIUM SERPL-SCNC: 139 MMOL/L (ref 136–145)
TRIGL SERPL-MCNC: 134 MG/DL (ref 0–150)
VLDLC SERPL CALC-MCNC: 27 MG/DL
WBC # BLD AUTO: 5.3 K/UL (ref 4–11)

## 2025-05-01 ENCOUNTER — HOSPITAL ENCOUNTER (OUTPATIENT)
Age: 60
Setting detail: OUTPATIENT SURGERY
Discharge: HOME | End: 2025-05-01
Attending: SURGERY | Admitting: SURGERY
Payer: COMMERCIAL

## 2025-05-01 ENCOUNTER — ANESTHESIA (OUTPATIENT)
Dept: OPERATING ROOM | Age: 60
End: 2025-05-01
Payer: COMMERCIAL

## 2025-05-01 VITALS
SYSTOLIC BLOOD PRESSURE: 131 MMHG | DIASTOLIC BLOOD PRESSURE: 74 MMHG | OXYGEN SATURATION: 97 % | TEMPERATURE: 98 F | HEART RATE: 99 BPM | BODY MASS INDEX: 32.88 KG/M2 | HEIGHT: 64 IN | WEIGHT: 192.6 LBS | RESPIRATION RATE: 12 BRPM

## 2025-05-01 DIAGNOSIS — N62 GYNECOMASTIA: ICD-10-CM

## 2025-05-01 DIAGNOSIS — Z98.890 S/P BILATERAL BREAST REDUCTION: Primary | ICD-10-CM

## 2025-05-01 PROCEDURE — 2500000003 HC RX 250 WO HCPCS

## 2025-05-01 PROCEDURE — 2709999900 HC NON-CHARGEABLE SUPPLY: Performed by: SURGERY

## 2025-05-01 PROCEDURE — 3600000014 HC SURGERY LEVEL 4 ADDTL 15MIN: Performed by: SURGERY

## 2025-05-01 PROCEDURE — 6360000002 HC RX W HCPCS

## 2025-05-01 PROCEDURE — 2580000003 HC RX 258: Performed by: SURGERY

## 2025-05-01 PROCEDURE — 6360000002 HC RX W HCPCS: Performed by: SURGERY

## 2025-05-01 PROCEDURE — 88305 TISSUE EXAM BY PATHOLOGIST: CPT

## 2025-05-01 PROCEDURE — 97605 NEG PRS WND THER DME<=50SQCM: CPT | Performed by: SURGERY

## 2025-05-01 PROCEDURE — 3700000001 HC ADD 15 MINUTES (ANESTHESIA): Performed by: SURGERY

## 2025-05-01 PROCEDURE — 19318 BREAST REDUCTION: CPT | Performed by: SURGERY

## 2025-05-01 PROCEDURE — L8000 MASTECTOMY BRA: HCPCS | Performed by: SURGERY

## 2025-05-01 PROCEDURE — 6370000000 HC RX 637 (ALT 250 FOR IP): Performed by: ANESTHESIOLOGY

## 2025-05-01 PROCEDURE — 7100000011 HC PHASE II RECOVERY - ADDTL 15 MIN: Performed by: SURGERY

## 2025-05-01 PROCEDURE — C1729 CATH, DRAINAGE: HCPCS | Performed by: SURGERY

## 2025-05-01 PROCEDURE — 2500000003 HC RX 250 WO HCPCS: Performed by: ANESTHESIOLOGY

## 2025-05-01 PROCEDURE — 2500000003 HC RX 250 WO HCPCS: Performed by: SURGERY

## 2025-05-01 PROCEDURE — 7100000001 HC PACU RECOVERY - ADDTL 15 MIN: Performed by: SURGERY

## 2025-05-01 PROCEDURE — 3600000004 HC SURGERY LEVEL 4 BASE: Performed by: SURGERY

## 2025-05-01 PROCEDURE — 3700000000 HC ANESTHESIA ATTENDED CARE: Performed by: SURGERY

## 2025-05-01 PROCEDURE — 7100000010 HC PHASE II RECOVERY - FIRST 15 MIN: Performed by: SURGERY

## 2025-05-01 PROCEDURE — 2720000010 HC SURG SUPPLY STERILE: Performed by: SURGERY

## 2025-05-01 PROCEDURE — 7100000000 HC PACU RECOVERY - FIRST 15 MIN: Performed by: SURGERY

## 2025-05-01 PROCEDURE — 6360000002 HC RX W HCPCS: Performed by: ANESTHESIOLOGY

## 2025-05-01 PROCEDURE — 19350 NIPPLE/AREOLA RECONSTRUCTION: CPT | Performed by: SURGERY

## 2025-05-01 RX ORDER — SODIUM CHLORIDE 0.9 % (FLUSH) 0.9 %
5-40 SYRINGE (ML) INJECTION EVERY 12 HOURS SCHEDULED
Status: DISCONTINUED | OUTPATIENT
Start: 2025-05-01 | End: 2025-05-01 | Stop reason: HOSPADM

## 2025-05-01 RX ORDER — LIDOCAINE HYDROCHLORIDE 20 MG/ML
INJECTION, SOLUTION INTRAVENOUS
Status: DISCONTINUED | OUTPATIENT
Start: 2025-05-01 | End: 2025-05-01 | Stop reason: SDUPTHER

## 2025-05-01 RX ORDER — IPRATROPIUM BROMIDE AND ALBUTEROL SULFATE 2.5; .5 MG/3ML; MG/3ML
1 SOLUTION RESPIRATORY (INHALATION)
Status: DISCONTINUED | OUTPATIENT
Start: 2025-05-01 | End: 2025-05-01 | Stop reason: HOSPADM

## 2025-05-01 RX ORDER — SODIUM CHLORIDE, SODIUM LACTATE, POTASSIUM CHLORIDE, CALCIUM CHLORIDE 600; 310; 30; 20 MG/100ML; MG/100ML; MG/100ML; MG/100ML
INJECTION, SOLUTION INTRAVENOUS CONTINUOUS
Status: DISCONTINUED | OUTPATIENT
Start: 2025-05-01 | End: 2025-05-01 | Stop reason: HOSPADM

## 2025-05-01 RX ORDER — FENTANYL CITRATE 50 UG/ML
25 INJECTION, SOLUTION INTRAMUSCULAR; INTRAVENOUS EVERY 5 MIN PRN
Status: DISCONTINUED | OUTPATIENT
Start: 2025-05-01 | End: 2025-05-01 | Stop reason: HOSPADM

## 2025-05-01 RX ORDER — SODIUM CHLORIDE 9 MG/ML
INJECTION, SOLUTION INTRAVENOUS PRN
Status: DISCONTINUED | OUTPATIENT
Start: 2025-05-01 | End: 2025-05-01 | Stop reason: HOSPADM

## 2025-05-01 RX ORDER — APREPITANT 40 MG/1
40 CAPSULE ORAL ONCE
Status: COMPLETED | OUTPATIENT
Start: 2025-05-01 | End: 2025-05-01

## 2025-05-01 RX ORDER — DOCUSATE SODIUM 100 MG/1
100 CAPSULE, LIQUID FILLED ORAL 2 TIMES DAILY
Qty: 30 CAPSULE | Refills: 0 | Status: SHIPPED | OUTPATIENT
Start: 2025-05-01

## 2025-05-01 RX ORDER — GLYCOPYRROLATE 0.2 MG/ML
INJECTION INTRAMUSCULAR; INTRAVENOUS
Status: DISCONTINUED | OUTPATIENT
Start: 2025-05-01 | End: 2025-05-01 | Stop reason: SDUPTHER

## 2025-05-01 RX ORDER — ROCURONIUM BROMIDE 10 MG/ML
INJECTION, SOLUTION INTRAVENOUS
Status: DISCONTINUED | OUTPATIENT
Start: 2025-05-01 | End: 2025-05-01 | Stop reason: SDUPTHER

## 2025-05-01 RX ORDER — ATORVASTATIN CALCIUM 20 MG/1
20 TABLET, FILM COATED ORAL DAILY
Qty: 30 TABLET | Refills: 5 | Status: SHIPPED | OUTPATIENT
Start: 2025-05-01

## 2025-05-01 RX ORDER — METHOCARBAMOL 100 MG/ML
INJECTION, SOLUTION INTRAMUSCULAR; INTRAVENOUS
Status: DISCONTINUED | OUTPATIENT
Start: 2025-05-01 | End: 2025-05-01 | Stop reason: SDUPTHER

## 2025-05-01 RX ORDER — LABETALOL HYDROCHLORIDE 5 MG/ML
10 INJECTION, SOLUTION INTRAVENOUS
Status: DISCONTINUED | OUTPATIENT
Start: 2025-05-01 | End: 2025-05-01 | Stop reason: HOSPADM

## 2025-05-01 RX ORDER — HYDROMORPHONE HYDROCHLORIDE 1 MG/ML
0.5 INJECTION, SOLUTION INTRAMUSCULAR; INTRAVENOUS; SUBCUTANEOUS EVERY 5 MIN PRN
Status: COMPLETED | OUTPATIENT
Start: 2025-05-01 | End: 2025-05-01

## 2025-05-01 RX ORDER — SCOPOLAMINE 1 MG/3D
1 PATCH, EXTENDED RELEASE TRANSDERMAL ONCE
Status: DISCONTINUED | OUTPATIENT
Start: 2025-05-01 | End: 2025-05-01 | Stop reason: HOSPADM

## 2025-05-01 RX ORDER — MEPERIDINE HYDROCHLORIDE 25 MG/ML
12.5 INJECTION INTRAMUSCULAR; INTRAVENOUS; SUBCUTANEOUS EVERY 5 MIN PRN
Status: DISCONTINUED | OUTPATIENT
Start: 2025-05-01 | End: 2025-05-01 | Stop reason: HOSPADM

## 2025-05-01 RX ORDER — ONDANSETRON 2 MG/ML
4 INJECTION INTRAMUSCULAR; INTRAVENOUS
Status: COMPLETED | OUTPATIENT
Start: 2025-05-01 | End: 2025-05-01

## 2025-05-01 RX ORDER — NALOXONE HYDROCHLORIDE 0.4 MG/ML
INJECTION, SOLUTION INTRAMUSCULAR; INTRAVENOUS; SUBCUTANEOUS PRN
Status: DISCONTINUED | OUTPATIENT
Start: 2025-05-01 | End: 2025-05-01 | Stop reason: HOSPADM

## 2025-05-01 RX ORDER — MIDAZOLAM HYDROCHLORIDE 1 MG/ML
INJECTION, SOLUTION INTRAMUSCULAR; INTRAVENOUS
Status: DISCONTINUED | OUTPATIENT
Start: 2025-05-01 | End: 2025-05-01 | Stop reason: SDUPTHER

## 2025-05-01 RX ORDER — MAGNESIUM HYDROXIDE 1200 MG/15ML
LIQUID ORAL CONTINUOUS PRN
Status: DISCONTINUED | OUTPATIENT
Start: 2025-05-01 | End: 2025-05-01 | Stop reason: HOSPADM

## 2025-05-01 RX ORDER — OXYCODONE HYDROCHLORIDE 5 MG/1
5 TABLET ORAL
Status: COMPLETED | OUTPATIENT
Start: 2025-05-01 | End: 2025-05-01

## 2025-05-01 RX ORDER — SODIUM CHLORIDE 0.9 % (FLUSH) 0.9 %
5-40 SYRINGE (ML) INJECTION PRN
Status: DISCONTINUED | OUTPATIENT
Start: 2025-05-01 | End: 2025-05-01 | Stop reason: HOSPADM

## 2025-05-01 RX ORDER — PROPOFOL 10 MG/ML
INJECTION, EMULSION INTRAVENOUS
Status: DISCONTINUED | OUTPATIENT
Start: 2025-05-01 | End: 2025-05-01 | Stop reason: SDUPTHER

## 2025-05-01 RX ORDER — PROCHLORPERAZINE EDISYLATE 5 MG/ML
5 INJECTION INTRAMUSCULAR; INTRAVENOUS
Status: DISCONTINUED | OUTPATIENT
Start: 2025-05-01 | End: 2025-05-01 | Stop reason: HOSPADM

## 2025-05-01 RX ORDER — FAMOTIDINE 10 MG/ML
INJECTION, SOLUTION INTRAVENOUS
Status: DISCONTINUED | OUTPATIENT
Start: 2025-05-01 | End: 2025-05-01 | Stop reason: SDUPTHER

## 2025-05-01 RX ORDER — LORAZEPAM 2 MG/ML
0.5 INJECTION INTRAMUSCULAR
Status: DISCONTINUED | OUTPATIENT
Start: 2025-05-01 | End: 2025-05-01 | Stop reason: HOSPADM

## 2025-05-01 RX ORDER — ONDANSETRON 2 MG/ML
INJECTION INTRAMUSCULAR; INTRAVENOUS
Status: DISCONTINUED | OUTPATIENT
Start: 2025-05-01 | End: 2025-05-01 | Stop reason: SDUPTHER

## 2025-05-01 RX ORDER — OXYCODONE HYDROCHLORIDE 5 MG/1
5 TABLET ORAL EVERY 6 HOURS PRN
Qty: 28 TABLET | Refills: 0 | Status: SHIPPED | OUTPATIENT
Start: 2025-05-01 | End: 2025-05-08

## 2025-05-01 RX ORDER — CALCIUM CHLORIDE 100 MG/ML
INJECTION INTRAVENOUS; INTRAVENTRICULAR
Status: DISCONTINUED | OUTPATIENT
Start: 2025-05-01 | End: 2025-05-01 | Stop reason: SDUPTHER

## 2025-05-01 RX ORDER — CEPHALEXIN 500 MG/1
500 CAPSULE ORAL 4 TIMES DAILY
Qty: 40 CAPSULE | Refills: 0 | Status: SHIPPED | OUTPATIENT
Start: 2025-05-01 | End: 2025-05-11

## 2025-05-01 RX ORDER — FENTANYL CITRATE 50 UG/ML
INJECTION, SOLUTION INTRAMUSCULAR; INTRAVENOUS
Status: DISCONTINUED | OUTPATIENT
Start: 2025-05-01 | End: 2025-05-01 | Stop reason: SDUPTHER

## 2025-05-01 RX ORDER — DIPHENHYDRAMINE HYDROCHLORIDE 50 MG/ML
12.5 INJECTION, SOLUTION INTRAMUSCULAR; INTRAVENOUS
Status: COMPLETED | OUTPATIENT
Start: 2025-05-01 | End: 2025-05-01

## 2025-05-01 RX ADMIN — CALCIUM CHLORIDE 0.25 G: 100 INJECTION, SOLUTION INTRAVENOUS at 11:54

## 2025-05-01 RX ADMIN — METHOCARBAMOL 1000 MG: 100 INJECTION, SOLUTION INTRAMUSCULAR; INTRAVENOUS at 11:34

## 2025-05-01 RX ADMIN — HYDROMORPHONE HYDROCHLORIDE 0.5 MG: 1 INJECTION, SOLUTION INTRAMUSCULAR; INTRAVENOUS; SUBCUTANEOUS at 13:09

## 2025-05-01 RX ADMIN — FENTANYL CITRATE 25 MCG: 50 INJECTION INTRAMUSCULAR; INTRAVENOUS at 14:30

## 2025-05-01 RX ADMIN — SUGAMMADEX 200 MG: 100 INJECTION, SOLUTION INTRAVENOUS at 12:53

## 2025-05-01 RX ADMIN — HYDROMORPHONE HYDROCHLORIDE 0.5 MG: 1 INJECTION, SOLUTION INTRAMUSCULAR; INTRAVENOUS; SUBCUTANEOUS at 12:11

## 2025-05-01 RX ADMIN — HYDROMORPHONE HYDROCHLORIDE 0.5 MG: 1 INJECTION, SOLUTION INTRAMUSCULAR; INTRAVENOUS; SUBCUTANEOUS at 13:18

## 2025-05-01 RX ADMIN — CALCIUM CHLORIDE 0.25 G: 100 INJECTION, SOLUTION INTRAVENOUS at 12:26

## 2025-05-01 RX ADMIN — APREPITANT 40 MG: 40 CAPSULE ORAL at 09:49

## 2025-05-01 RX ADMIN — ROCURONIUM BROMIDE 60 MG: 10 INJECTION, SOLUTION INTRAVENOUS at 11:05

## 2025-05-01 RX ADMIN — HYDROMORPHONE HYDROCHLORIDE 0.5 MG: 1 INJECTION, SOLUTION INTRAMUSCULAR; INTRAVENOUS; SUBCUTANEOUS at 13:48

## 2025-05-01 RX ADMIN — MIDAZOLAM HYDROCHLORIDE 2 MG: 1 INJECTION, SOLUTION INTRAMUSCULAR; INTRAVENOUS at 10:59

## 2025-05-01 RX ADMIN — SODIUM CHLORIDE, SODIUM LACTATE, POTASSIUM CHLORIDE, AND CALCIUM CHLORIDE: .6; .31; .03; .02 INJECTION, SOLUTION INTRAVENOUS at 12:26

## 2025-05-01 RX ADMIN — HYDROMORPHONE HYDROCHLORIDE 0.5 MG: 1 INJECTION, SOLUTION INTRAMUSCULAR; INTRAVENOUS; SUBCUTANEOUS at 13:33

## 2025-05-01 RX ADMIN — ROCURONIUM BROMIDE 40 MG: 10 INJECTION, SOLUTION INTRAVENOUS at 11:11

## 2025-05-01 RX ADMIN — OXYCODONE 5 MG: 5 TABLET ORAL at 16:18

## 2025-05-01 RX ADMIN — WATER 2000 MG: 1 INJECTION INTRAMUSCULAR; INTRAVENOUS; SUBCUTANEOUS at 11:11

## 2025-05-01 RX ADMIN — DIPHENHYDRAMINE HYDROCHLORIDE 12.5 MG: 50 INJECTION INTRAMUSCULAR; INTRAVENOUS at 14:32

## 2025-05-01 RX ADMIN — ONDANSETRON 4 MG: 2 INJECTION INTRAMUSCULAR; INTRAVENOUS at 11:37

## 2025-05-01 RX ADMIN — PROPOFOL 150 MG: 10 INJECTION, EMULSION INTRAVENOUS at 11:04

## 2025-05-01 RX ADMIN — HYDROMORPHONE HYDROCHLORIDE 0.5 MG: 1 INJECTION, SOLUTION INTRAMUSCULAR; INTRAVENOUS; SUBCUTANEOUS at 12:26

## 2025-05-01 RX ADMIN — SODIUM CHLORIDE, SODIUM LACTATE, POTASSIUM CHLORIDE, AND CALCIUM CHLORIDE: .6; .31; .03; .02 INJECTION, SOLUTION INTRAVENOUS at 09:55

## 2025-05-01 RX ADMIN — FENTANYL CITRATE 50 MCG: 50 INJECTION, SOLUTION INTRAMUSCULAR; INTRAVENOUS at 11:04

## 2025-05-01 RX ADMIN — FENTANYL CITRATE 50 MCG: 50 INJECTION, SOLUTION INTRAMUSCULAR; INTRAVENOUS at 11:28

## 2025-05-01 RX ADMIN — CALCIUM CHLORIDE 0.25 G: 100 INJECTION, SOLUTION INTRAVENOUS at 12:30

## 2025-05-01 RX ADMIN — CALCIUM CHLORIDE 0.25 G: 100 INJECTION, SOLUTION INTRAVENOUS at 11:48

## 2025-05-01 RX ADMIN — GLYCOPYRROLATE 0.2 MG: 0.2 INJECTION INTRAMUSCULAR; INTRAVENOUS at 12:36

## 2025-05-01 RX ADMIN — LIDOCAINE HYDROCHLORIDE 100 MG: 20 INJECTION, SOLUTION INTRAVENOUS at 11:04

## 2025-05-01 RX ADMIN — TRANEXAMIC ACID 1000 MG: 100 INJECTION, SOLUTION INTRAVENOUS at 11:11

## 2025-05-01 RX ADMIN — FAMOTIDINE 20 MG: 10 INJECTION, SOLUTION INTRAVENOUS at 11:37

## 2025-05-01 RX ADMIN — ONDANSETRON 4 MG: 2 INJECTION, SOLUTION INTRAMUSCULAR; INTRAVENOUS at 16:18

## 2025-05-01 ASSESSMENT — PAIN DESCRIPTION - ORIENTATION
ORIENTATION: RIGHT;LEFT
ORIENTATION: RIGHT;LEFT

## 2025-05-01 ASSESSMENT — PAIN - FUNCTIONAL ASSESSMENT
PAIN_FUNCTIONAL_ASSESSMENT: 0-10
PAIN_FUNCTIONAL_ASSESSMENT: 0-10

## 2025-05-01 ASSESSMENT — PAIN SCALES - GENERAL
PAINLEVEL_OUTOF10: 5
PAINLEVEL_OUTOF10: 5
PAINLEVEL_OUTOF10: 7
PAINLEVEL_OUTOF10: 7
PAINLEVEL_OUTOF10: 8
PAINLEVEL_OUTOF10: 7

## 2025-05-01 ASSESSMENT — PAIN DESCRIPTION - DESCRIPTORS
DESCRIPTORS: BURNING
DESCRIPTORS: BURNING
DESCRIPTORS: DISCOMFORT
DESCRIPTORS: PRESSURE;SQUEEZING

## 2025-05-01 ASSESSMENT — PAIN DESCRIPTION - LOCATION
LOCATION: BREAST
LOCATION: BREAST

## 2025-05-01 NOTE — ANESTHESIA POSTPROCEDURE EVALUATION
Department of Anesthesiology  Postprocedure Note    Patient: Ambar Martinez  MRN: [id number]  YOB: 1965  Date of evaluation: 5/1/2025    Procedure Summary       Date: 05/01/25 Room / Location: [place] [id number] / [facility]    Anesthesia Start: 1059 Anesthesia Stop: 1305    Procedure: Bilateral breast amputation with free nipple grafting (Bilateral: Breast) Diagnosis:       Gynecomastia      (Gynecomastia [N62])    Surgeons: Lavonne Berry MD Responsible Provider: Damián Lamar MD    Anesthesia Type: general ASA Status: 2            Anesthesia Type: No value filed.    Dalia Phase I: Dalia Score: 10    Dalia Phase II:      Anesthesia Post Evaluation    Patient location during evaluation: PACU  Patient participation: complete - patient participated  Level of consciousness: awake  Airway patency: patent  Nausea & Vomiting: no nausea and no vomiting  Cardiovascular status: blood pressure returned to baseline and hemodynamically stable  Respiratory status: acceptable  Hydration status: euvolemic  Multimodal analgesia pain management approach  Pain management: adequate    No notable events documented.

## 2025-05-01 NOTE — PROGRESS NOTES
PACU Transfer to [facility]    Vitals:    05/01/25 1600   BP: 128/61   Pulse:    Resp: 12   Temp: 98.1 °F (36.7 °C)   SpO2: 99%         Intake/Output Summary (Last 24 hours) at 5/1/2025 1611  Last data filed at 5/1/2025 1258  Gross per 24 hour   Intake 1360 ml   Output --   Net 1360 ml       Pain assessment:  none  Pain Level: 5    Patient transferred to care of [facility] RN.    5/1/2025 4:11 PM

## 2025-05-01 NOTE — PROGRESS NOTES
-Pt arrived to [facility] for with  Judd (Daniel). He does not use a phone so you will have to talk with him in person. Daughter Teagan is signed up for text updates and will be the person for Dr. Berry to call.   -Patient is resting in bed with call light.  -Antibiotic on hold to OR- Ancef on hold to OR  -Pt belongings bag- 1 bag to locked room, includes glasses  -IV patent w/ IV fluids running.    -Labs sent- none ordered  -Emend and Scop patch given in preop for PONV.

## 2025-05-01 NOTE — PROGRESS NOTES
Ambulatory Surgery/Procedure Discharge Note    Vitals:    05/01/25 1613   BP: 131/74   Pulse: 99   Resp: 12   Temp: 98 °F (36.7 °C)   SpO2: 97%     BP meets rafiq standard for discharge  In: 2510 [P.O.:150; I.V.:2300]  Out: -     Restroom use offered before discharge.  Yes    Pain assessment:  none  Pain Level: 5    Pt and S.O./family states \"ready to go home\". Pt alert and oriented x4. IV removed. Denies N/V or pain. Dressing C/D/I. Voided prior to discharge. Pt tolerating po intake. Discharge instructions given to pt and  with pt permission. Pt and  verbalized understanding of all instructions. Left with all belongings, 3 prescriptions, and discharge instructions.       Patient discharged to home/self care. Patient discharged via wheel chair by transporter to waiting family/S.O.       5/1/2025 1700

## 2025-05-01 NOTE — BRIEF OP NOTE
Brief Postoperative Note      Patient: Ambar aMrtinez  YOB: 1965  MRN: [id number]    Date of Procedure: 5/1/2025    Pre-Op Diagnosis Codes:      * Gynecomastia [N62]    Post-Op Diagnosis: Same       Procedure(s):  Bilateral breast amputation with free nipple grafting    Surgeon(s):  Lavonne Berry MD Kundu, Neilendu, MD    Assistant:  Surgical Assistant: Tatyana Grigsby  Resident: Shayla Davies DO    Anesthesia: General    Estimated Blood Loss (mL): Minimal    Complications: None    Specimens:   ID Type Source Tests Collected by Time Destination   A : RIGHT BREAST TISSUE Tissue Tissue SURGICAL PATHOLOGY Lavonne Berry MD 5/1/2025 1142    B : LEFT BREAST TISSUE Tissue Tissue SURGICAL PATHOLOGY Lavonne Berry MD 5/1/2025 1158        Implants:  * No implants in log *      Drains:   Closed/Suction Drain Inferior;Lateral;Right Breast Bulb (Active)   Site Description Clean, dry & intact 05/01/25 1211   Dressing Status New dressing applied 05/01/25 1211   Drain Status To bulb suction 05/01/25 1211       Closed/Suction Drain Inferior;Lateral;Left Breast Bulb (Active)   Site Description Clean, dry & intact 05/01/25 1211   Dressing Status New dressing applied 05/01/25 1211   Drain Status To bulb suction 05/01/25 1211       Negative Pressure Wound Therapy Breast Left (Active)   Dressing Status Intact w/seal 05/01/25 1245   Canister changed? Yes 05/01/25 1245   Unit Type PREVENA 14 DAY UNIT 05/01/25 1245   Mode Continuous 05/01/25 1245       Negative Pressure Wound Therapy Breast Right (Active)   Dressing Status Intact w/seal 05/01/25 1246   Canister changed? Yes 05/01/25 1246   Unit Type PREVENA 14 DAY UNIT 05/01/25 1246   Mode Continuous 05/01/25 1246       Findings:  Infection Present At Time Of Surgery (PATOS) (choose all levels that have infection present):  No infection present  Other Findings: Bilateral breast amputation, 2 PHYLICIA drains. Mary Wound VAC in placed to each breast    Electronically signed

## 2025-05-01 NOTE — ANESTHESIA PRE PROCEDURE
Department of Anesthesiology  Preprocedure Note       Name:  Ambar Martinez   Age:  60 y.o.  :  1965                                          MRN:  [id number]         Date:  2025      Surgeon: Surgeon(s):  Lavonne Berry MD    Procedure: Procedure(s):  Bilateral breast amputation with free nipple grafting    Medications prior to admission:   Prior to Admission medications    Medication Sig Start Date End Date Taking? Authorizing Provider   ondansetron (ZOFRAN-ODT) 4 MG disintegrating tablet Take 1 tablet by mouth 3 times daily as needed for Nausea or Vomiting 25   Magda Valera APRN - CNP   traZODone (DESYREL) 50 MG tablet Take 2 tablets by mouth nightly 25   Alvarez Mckinley MD   esomeprazole (NEXIUM) 40 MG delayed release capsule TAKE 1 CAPSULE BY MOUTH EVERY MORNING BEFORE BREAKFAST 10/19/24   Alvarez Mckinley MD   EPINEPHrine (EPIPEN 2-CRUZ) 0.3 MG/0.3ML SOAJ injection Inject 0.3 mLs into the muscle daily as needed (anaphylaxis) Use as directed for allergic reaction 24   Alvarez Mckinley MD   meclizine (ANTIVERT) 12.5 MG tablet Take 1 tablet by mouth 3 times daily as needed for Dizziness 24   Alvarez Mckinley MD   hydroxyurea (HYDREA) 500 MG chemo capsule Take 2 capsules by mouth daily    ProviderLeon MD   aspirin 81 MG EC tablet Take 1 tablet by mouth in the morning and at bedtime    ProviderLeon MD       Current medications:    Current Facility-Administered Medications   Medication Dose Route Frequency Provider Last Rate Last Admin   • lactated ringers infusion   IntraVENous Continuous Filemon Connell MD       • tranexamic acid (CYKLOKAPRON) 1,000 mg in sodium chloride 0.9 % 60 mL IVPB  1,000 mg IntraVENous Once Lavonne Berry MD       • sodium chloride flush 0.9 % injection 5-40 mL  5-40 mL IntraVENous 2 times per day Lavonne Berry MD       • sodium chloride flush 0.9 % injection 5-40 mL  5-40 mL IntraVENous PRN Lavonne Berry MD       •

## 2025-05-01 NOTE — DISCHARGE INSTRUCTIONS
[facility]    Geovany Berry MD  [address] ([address])  [place] [address] [phone]    Post-op Instructions  ___________________________________________    Bilateral Breast Amputation with Free Nipple Graft    The following instructions will help you know what to expect in the days following your surgery. Do not, however, hesitate to call if you have questions or concerns.    Activities  - Sleep in a flexed position with your head and shoulders elevated. Keep pillows under your knees for the first few days. You can resume your normal sleeping position when comfortable. Also be careful not to sleep on the cord that connects the vac dressing to the home machine as this can cause pressure problems on your skin.    - Driving is prohibited for 1 to 2 weeks. Do not drive while taking pain medications.   - Avoid heavy lifting (no more than 5 pounds) and vigorous use of your arms for the first 3 weeks.   - Do not engage in sports, aerobics, or vacuuming.   - Start arm raising exercises gently within 1 week of surgery. This will be discussed at your follow-up appointment.     Diet  - Resume your preoperative diet as tolerated.     Pain Control/Medications  - You will receive a prescription for pain medication that can be taken as directed if needed for pain control. The pain medication may cause constipation and does impair your ability to drive or make important decisions.          - You may also be given a muscle relaxant that can help with muscle spasms. Do                  not take the pain medication and muscle relaxant at the same time        - There is absolutely NO driving while on pain medication or Valium® or                        Flexoril® .        - Additionally do NOT take any of the following products:    Aspirin/low-dose aspirin    Ibuprofen (Advil®, Motrin®    Naprosyn or Naproxen (Aleve®)    Vitamin E (even small amounts in multiple vitamins)    Herbals or

## 2025-05-02 DIAGNOSIS — G89.18 POST-OP PAIN: Primary | ICD-10-CM

## 2025-05-02 RX ORDER — DIAZEPAM 5 MG/1
5 TABLET ORAL EVERY 8 HOURS PRN
Qty: 20 TABLET | Refills: 0 | Status: SHIPPED | OUTPATIENT
Start: 2025-05-02 | End: 2025-05-12

## 2025-05-02 NOTE — OP NOTE
[facility]     OPERATIVE DICTATION    NAME: Ambar Martinez   MRN: [id number]  DATE: 05/01/2025     AGE: 60 y.o.    PREOPERATIVE DIAGNOSIS:  Gigantomastia.     POSTOPERATIVE DIAGNOSIS:  Gigantomastia.     OPERATION PERFORMED:  1. Bilateral breast amputations     2. Bilateral free nipple grafting.   3. Application of Mary incisional wound vacuum devices     SURGEON:  Lavonne Berry MD     ASSISTANT:  Tatyana Grigsby ().     ANESTHESIA:  General.     ESTIMATED BLOOD LOSS:  75 mL.     DRAINS:  2 (1 each breast).     SPECIMENS:  Breast tissue (right breast 1210 gm, left breast 1118 gm).     OPERATIVE INDICATIONS:  This is an 60 y.o.-year-old female with a history of symptomatic gigantomastia.  She desired breast reduction to alleviate her symptoms and given her size we discussed breast amputation with free nipple grafting. The risks, benefits, alternatives, outcomes, and personnel involved with the aforementioned procedure were discussed in detail with the patient.  After all questions were answered in a satisfactory manner, she agreed to proceed.     OPERATIVE PROCEDURE:  The patient was marked in the preoperative holding area and then brought to the operating room, and placed in supine position on the operating room table.  After satisfactory induction of general endotracheal anesthesia, the patient was then prepped and draped in the usual sterile manner.  Time-out was performed confirming the patient and the procedure to be performed.  The operation commenced via excising the nipple areolar complexes on both breasts and labeling them in moistened gauze and placing them on the back table.  I then turned my attention back to the breasts and beginning with the right breast, a Wise pattern marking was incised.  Breast tissue was excised from the inferior aspect of the breast in a wedge manner removing this in a single piece.  This was then sent to pathology as specimen. Hemostasis was

## 2025-05-02 NOTE — PROGRESS NOTES
Patient complaining of break thru pain despite pain medications. She request valium to be sent to pharmacy.     Sandra

## 2025-05-08 ENCOUNTER — OFFICE VISIT (OUTPATIENT)
Dept: SURGERY | Age: 60
End: 2025-05-08

## 2025-05-08 VITALS
SYSTOLIC BLOOD PRESSURE: 126 MMHG | WEIGHT: 192 LBS | DIASTOLIC BLOOD PRESSURE: 83 MMHG | BODY MASS INDEX: 32.78 KG/M2 | HEIGHT: 64 IN | HEART RATE: 99 BPM

## 2025-05-08 DIAGNOSIS — G89.18 POST-OP PAIN: Primary | ICD-10-CM

## 2025-05-08 PROCEDURE — 99024 POSTOP FOLLOW-UP VISIT: CPT

## 2025-05-08 RX ORDER — ESOMEPRAZOLE MAGNESIUM 40 MG/1
40 CAPSULE, DELAYED RELEASE ORAL
Qty: 30 CAPSULE | Refills: 5 | Status: SHIPPED | OUTPATIENT
Start: 2025-05-08

## 2025-05-08 RX ORDER — DIAZEPAM 5 MG/1
5 TABLET ORAL EVERY 12 HOURS PRN
Qty: 20 TABLET | Refills: 0 | Status: SHIPPED | OUTPATIENT
Start: 2025-05-08 | End: 2025-05-18

## 2025-05-08 NOTE — PROGRESS NOTES
[facility]    PROCEDURE:  1. Bilateral breast amputations                         2. Bilateral free nipple grafting.  3. Application of Elder incisional wound vacuum devices  DATE: 5/1/25    Ambar Martinez has been recovering satisfactorily since her procedure. Pain has been well controlled without pain medications.     EXAM    /83   Pulse 99   Ht 1.626 m (5' 4\")   Wt 87.1 kg (192 lb)   LMP 03/13/2019 (Approximate)   BMI 32.96 kg/m²       GEN: NAD   BREAST: Wound vac in tact and holding suction. Drains serosang.     PATHOLOGY: FINAL DIAGNOSIS:     A.  Right breast tissue, excision:   - Benign skin and underlying breast tissue with usual ductal epithelial   hyperplasia and fibrocystic change.     B.  Left breast tissue, excision:   - Benign skin and underlying breast tissue with fibrocystic change and   usual ductal epithelial hyperplasia.     IMP: 60 y.o.female s/p Bilateral breast amputations, bilateral free nipple grafting, application of elder incisional wound vac   PLAN: Overall doing well. She will return next week for potential drain removal and wound vac take down. She will call with any concerns in the interim.     Magda Valera, APRN - CNP   [facility]  [phone]  05/08/25

## 2025-05-08 NOTE — TELEPHONE ENCOUNTER
Medication:   Requested Prescriptions     Pending Prescriptions Disp Refills    esomeprazole (NEXIUM) 40 MG delayed release capsule 30 capsule 5     Sig: Take 1 capsule by mouth every morning (before breakfast)     Last Filled:      Last appt: 4/29/2025   Next appt: Visit date not found

## 2025-05-13 ENCOUNTER — OFFICE VISIT (OUTPATIENT)
Dept: SURGERY | Age: 60
End: 2025-05-13

## 2025-05-13 VITALS
OXYGEN SATURATION: 98 % | SYSTOLIC BLOOD PRESSURE: 128 MMHG | TEMPERATURE: 97.8 F | BODY MASS INDEX: 32.96 KG/M2 | DIASTOLIC BLOOD PRESSURE: 86 MMHG | HEART RATE: 100 BPM | WEIGHT: 192 LBS

## 2025-05-13 DIAGNOSIS — Z09 POSTOP CHECK: Primary | ICD-10-CM

## 2025-05-13 PROCEDURE — 99024 POSTOP FOLLOW-UP VISIT: CPT

## 2025-05-13 NOTE — PROGRESS NOTES
[facility]    PROCEDURE:  1. Bilateral breast amputations                         2. Bilateral free nipple grafting.  3. Application of Elder incisional wound vacuum devices  DATE: 5/1/25    Ambar Martinez has been recovering well since her procedure. Pain has been well controlled without pain medications.     EXAM    /86   Pulse 100   Temp 97.8 °F (36.6 °C)   Wt 87.1 kg (192 lb)   LMP 03/13/2019 (Approximate)   SpO2 98%   BMI 32.96 kg/m²       GEN: NAD   BREAST: Incision site healing appropriately. No hematoma/seroma. Nipple grafts viable. Drains serosang.     PATHOLOGY: FINAL DIAGNOSIS:     A.  Right breast tissue, excision:   - Benign skin and underlying breast tissue with usual ductal epithelial   hyperplasia and fibrocystic change.     B.  Left breast tissue, excision:   - Benign skin and underlying breast tissue with fibrocystic change and   usual ductal epithelial hyperplasia.     IMP: 60 y.o.female s/p Bilateral breast amputations, bilateral free nipple grafting, application of elder incisional wound vac   PLAN: Overall doing well. Wound vac removed and bilateral breast drains removed. She will do local wound care of bacitracin and xeroform daily. She will follow up with Dr. Berry in one month to ensure continued wound healing. She will call with any concerns in the interim.     Magda Valera, APRN - CNP   [facility]  [phone]  05/13/25

## 2025-05-20 ENCOUNTER — OFFICE VISIT (OUTPATIENT)
Dept: SURGERY | Age: 60
End: 2025-05-20

## 2025-05-20 VITALS
SYSTOLIC BLOOD PRESSURE: 121 MMHG | HEART RATE: 97 BPM | BODY MASS INDEX: 30.83 KG/M2 | DIASTOLIC BLOOD PRESSURE: 80 MMHG | HEIGHT: 64 IN | WEIGHT: 180.6 LBS

## 2025-05-20 DIAGNOSIS — Z09 POSTOP CHECK: Primary | ICD-10-CM

## 2025-05-20 PROCEDURE — 99024 POSTOP FOLLOW-UP VISIT: CPT

## 2025-05-20 NOTE — PROGRESS NOTES
[facility]    PROCEDURE:  1. Bilateral breast amputations                         2. Bilateral free nipple grafting.  3. Application of Elder incisional wound vacuum devices  DATE: 5/1/25    Ambar Martinez has been recovering well since her procedure. Pain has been well controlled without pain medications. Patient presents today with concerns of bleeding from right nipple.     EXAM    /80   Pulse 97   Ht 1.626 m (5' 4\")   Wt 81.9 kg (180 lb 9.6 oz)   LMP 03/13/2019 (Approximate)   BMI 31.00 kg/m²       GEN: NAD   BREAST: Incision site healing appropriately. No hematoma/seroma. Nipple grafts viable with exception of some exudate and bleeding from right nipple.      PATHOLOGY: FINAL DIAGNOSIS:     A.  Right breast tissue, excision:   - Benign skin and underlying breast tissue with usual ductal epithelial   hyperplasia and fibrocystic change.     B.  Left breast tissue, excision:   - Benign skin and underlying breast tissue with fibrocystic change and   usual ductal epithelial hyperplasia.     IMP: 60 y.o.female s/p Bilateral breast amputations, bilateral free nipple grafting, application of elder incisional wound vac   PLAN: Today in office silver nitrate used on right nipple. No bleeding seen after completion. She will continue local wound care over grafts. She will return next week for wound check. She will call with any concerns in the interim.     LEANA Schaeffer - CNP   [facility]  [phone]  05/20/25

## 2025-05-21 ENCOUNTER — OFFICE VISIT (OUTPATIENT)
Dept: SURGERY | Age: 60
End: 2025-05-21

## 2025-05-21 VITALS
SYSTOLIC BLOOD PRESSURE: 121 MMHG | DIASTOLIC BLOOD PRESSURE: 80 MMHG | WEIGHT: 180 LBS | OXYGEN SATURATION: 98 % | BODY MASS INDEX: 30.9 KG/M2 | HEART RATE: 85 BPM | TEMPERATURE: 98.4 F

## 2025-05-21 DIAGNOSIS — Z09 POSTOP CHECK: Primary | ICD-10-CM

## 2025-05-21 PROCEDURE — 99024 POSTOP FOLLOW-UP VISIT: CPT

## 2025-05-21 RX ORDER — CEPHALEXIN 500 MG/1
500 CAPSULE ORAL 4 TIMES DAILY
Qty: 28 CAPSULE | Refills: 0 | Status: SHIPPED | OUTPATIENT
Start: 2025-05-21 | End: 2025-05-28

## 2025-05-21 NOTE — PROGRESS NOTES
[facility]    PROCEDURE:  1. Bilateral breast amputations                         2. Bilateral free nipple grafting.  3. Application of Elder incisional wound vacuum devices  DATE: 5/1/25    Ambar Martinez has been recovering well since her procedure. Pain has been well controlled without pain medications. Patient presents today with persistent bleeding from right nipple, it has improved slightly since yesterdays intervention.     EXAM    /80   Pulse 85   Temp 98.4 °F (36.9 °C)   Wt 81.6 kg (180 lb)   LMP 03/13/2019 (Approximate)   SpO2 98%   BMI 30.90 kg/m²       GEN: NAD   BREAST: Incision site healing appropriately. No hematoma/seroma. Nipple grafts viable with exception of some exudate and bleeding from right nipple.      PATHOLOGY: FINAL DIAGNOSIS:     A.  Right breast tissue, excision:   - Benign skin and underlying breast tissue with usual ductal epithelial   hyperplasia and fibrocystic change.     B.  Left breast tissue, excision:   - Benign skin and underlying breast tissue with fibrocystic change and   usual ductal epithelial hyperplasia.     IMP: 60 y.o.female s/p Bilateral breast amputations, bilateral free nipple grafting, application of elder incisional wound vac   PLAN: Today in office steri strips applied. We will have her begin silvadene cream on right nipple as well. She will return next week for wound check. We will also prescribe prophylactic antibiotic.  She will call with any concerns in the interim.     Magda Valera, APRN - CNP   [facility]  [phone]  05/21/25

## 2025-05-27 ENCOUNTER — OFFICE VISIT (OUTPATIENT)
Dept: SURGERY | Age: 60
End: 2025-05-27

## 2025-05-27 VITALS
HEIGHT: 64 IN | SYSTOLIC BLOOD PRESSURE: 125 MMHG | WEIGHT: 179.2 LBS | BODY MASS INDEX: 30.59 KG/M2 | DIASTOLIC BLOOD PRESSURE: 77 MMHG | HEART RATE: 100 BPM

## 2025-05-27 DIAGNOSIS — Z09 POSTOP CHECK: Primary | ICD-10-CM

## 2025-05-27 PROCEDURE — 99024 POSTOP FOLLOW-UP VISIT: CPT

## 2025-05-28 NOTE — PROGRESS NOTES
[facility]    PROCEDURE:  1. Bilateral breast amputations                         2. Bilateral free nipple grafting.  3. Application of Elder incisional wound vacuum devices  DATE: 5/1/25    Ambar Martinez has been recovering well satisfactorily her procedure. Pain has been well controlled without pain medications. Patient presents today for wound check of right nipple graft. She states the bleeding has finally improved, she contributes it most likely to her daily aspirin and hydroxyurea use. She did stop them both for a short interval and since then the bleeding reduced to only a scant amount.     EXAM    /77   Pulse 100   Ht 1.626 m (5' 4\")   Wt 81.3 kg (179 lb 3.2 oz)   LMP 03/13/2019 (Approximate)   BMI 30.76 kg/m²       GEN: NAD   BREAST: Incision site healing appropriately. No hematoma/seroma. Left nipple graft viable, right nipple graft with some exudate and poor perfusion,no drainage, no signs of infection.     PATHOLOGY: FINAL DIAGNOSIS:     A.  Right breast tissue, excision:   - Benign skin and underlying breast tissue with usual ductal epithelial   hyperplasia and fibrocystic change.     B.  Left breast tissue, excision:   - Benign skin and underlying breast tissue with fibrocystic change and   usual ductal epithelial hyperplasia.     IMP: 60 y.o.female s/p Bilateral breast amputations, bilateral free nipple grafting, application of elder incisional wound vac   PLAN: She will continue silvadene and xeroform daily on right nipple. She will return to see Dr. Berry in one week for routine post op and wound check. She will call with any concerns in the interim.     Magda Valera, APRN - CNP   [facility]  [phone]  5/27/25

## 2025-06-02 ENCOUNTER — HOSPITAL ENCOUNTER (EMERGENCY)
Age: 60
Discharge: HOME | End: 2025-06-02
Attending: EMERGENCY MEDICINE
Payer: COMMERCIAL

## 2025-06-02 ENCOUNTER — APPOINTMENT (OUTPATIENT)
Dept: CT IMAGING | Age: 60
End: 2025-06-02
Payer: COMMERCIAL

## 2025-06-02 ENCOUNTER — APPOINTMENT (OUTPATIENT)
Dept: GENERAL RADIOLOGY | Age: 60
End: 2025-06-02
Payer: COMMERCIAL

## 2025-06-02 VITALS
TEMPERATURE: 97.4 F | OXYGEN SATURATION: 95 % | BODY MASS INDEX: 30.56 KG/M2 | DIASTOLIC BLOOD PRESSURE: 57 MMHG | WEIGHT: 179 LBS | RESPIRATION RATE: 17 BRPM | SYSTOLIC BLOOD PRESSURE: 122 MMHG | HEART RATE: 86 BPM | HEIGHT: 64 IN

## 2025-06-02 DIAGNOSIS — R35.89 POLYURIA: ICD-10-CM

## 2025-06-02 DIAGNOSIS — R07.9 CHEST PAIN, UNSPECIFIED TYPE: Primary | ICD-10-CM

## 2025-06-02 LAB
ALBUMIN SERPL-MCNC: 4.1 G/DL (ref 3.4–5)
ALBUMIN/GLOB SERPL: 1.4 {RATIO} (ref 1.1–2.2)
ALP SERPL-CCNC: 77 U/L (ref 40–129)
ALT SERPL-CCNC: 12 U/L (ref 10–40)
ANION GAP SERPL CALCULATED.3IONS-SCNC: 12 MMOL/L (ref 3–16)
AST SERPL-CCNC: 25 U/L (ref 15–37)
BASOPHILS # BLD: 0.1 K/UL (ref 0–0.2)
BASOPHILS NFR BLD: 0.8 %
BILIRUB SERPL-MCNC: 0.3 MG/DL (ref 0–1)
BILIRUB UR QL STRIP.AUTO: NEGATIVE
BUN SERPL-MCNC: 19 MG/DL (ref 7–20)
CALCIUM SERPL-MCNC: 9.4 MG/DL (ref 8.3–10.6)
CHLORIDE SERPL-SCNC: 102 MMOL/L (ref 99–110)
CLARITY UR: CLEAR
CO2 SERPL-SCNC: 22 MMOL/L (ref 21–32)
COLOR UR: YELLOW
CREAT SERPL-MCNC: 0.6 MG/DL (ref 0.6–1.2)
DEPRECATED RDW RBC AUTO: 14.7 % (ref 12.4–15.4)
EKG ATRIAL RATE: 90 BPM
EKG DIAGNOSIS: NORMAL
EKG P AXIS: 27 DEGREES
EKG P-R INTERVAL: 138 MS
EKG Q-T INTERVAL: 352 MS
EKG QRS DURATION: 78 MS
EKG QTC CALCULATION (BAZETT): 430 MS
EKG R AXIS: 26 DEGREES
EKG T AXIS: 33 DEGREES
EKG VENTRICULAR RATE: 90 BPM
EOSINOPHIL # BLD: 0.1 K/UL (ref 0–0.6)
EOSINOPHIL NFR BLD: 2.3 %
GFR SERPLBLD CREATININE-BSD FMLA CKD-EPI: >90 ML/MIN/{1.73_M2}
GLUCOSE SERPL-MCNC: 100 MG/DL (ref 70–99)
GLUCOSE UR STRIP.AUTO-MCNC: NEGATIVE MG/DL
HCT VFR BLD AUTO: 41.7 % (ref 36–48)
HGB BLD-MCNC: 14.4 G/DL (ref 12–16)
HGB UR QL STRIP.AUTO: NEGATIVE
KETONES UR STRIP.AUTO-MCNC: 15 MG/DL
LEUKOCYTE ESTERASE UR QL STRIP.AUTO: NEGATIVE
LYMPHOCYTES # BLD: 1.5 K/UL (ref 1–5.1)
LYMPHOCYTES NFR BLD: 24.5 %
MCH RBC QN AUTO: 32.9 PG (ref 26–34)
MCHC RBC AUTO-ENTMCNC: 34.7 G/DL (ref 31–36)
MCV RBC AUTO: 95 FL (ref 80–100)
MONOCYTES # BLD: 0.7 K/UL (ref 0–1.3)
MONOCYTES NFR BLD: 12.3 %
NEUTROPHILS # BLD: 3.6 K/UL (ref 1.7–7.7)
NEUTROPHILS NFR BLD: 60.1 %
NITRITE UR QL STRIP.AUTO: NEGATIVE
NT-PROBNP SERPL-MCNC: <36 PG/ML (ref 0–124)
PH UR STRIP.AUTO: 6.5 [PH] (ref 5–8)
PLATELET # BLD AUTO: 537 K/UL (ref 135–450)
PMV BLD AUTO: 8.1 FL (ref 5–10.5)
POTASSIUM SERPL-SCNC: 4.3 MMOL/L (ref 3.5–5.1)
PROT SERPL-MCNC: 7.1 G/DL (ref 6.4–8.2)
PROT UR STRIP.AUTO-MCNC: NEGATIVE MG/DL
RBC # BLD AUTO: 4.39 M/UL (ref 4–5.2)
SODIUM SERPL-SCNC: 136 MMOL/L (ref 136–145)
SP GR UR STRIP.AUTO: <=1.005 (ref 1–1.03)
TROPONIN, HIGH SENSITIVITY: <6 NG/L (ref 0–14)
UA COMPLETE W REFLEX CULTURE PNL UR: ABNORMAL
UA DIPSTICK W REFLEX MICRO PNL UR: ABNORMAL
URN SPEC COLLECT METH UR: ABNORMAL
UROBILINOGEN UR STRIP-ACNC: 0.2 E.U./DL
WBC # BLD AUTO: 5.9 K/UL (ref 4–11)

## 2025-06-02 PROCEDURE — 96360 HYDRATION IV INFUSION INIT: CPT

## 2025-06-02 PROCEDURE — 2580000003 HC RX 258

## 2025-06-02 PROCEDURE — 83880 ASSAY OF NATRIURETIC PEPTIDE: CPT

## 2025-06-02 PROCEDURE — 99285 EMERGENCY DEPT VISIT HI MDM: CPT

## 2025-06-02 PROCEDURE — 96361 HYDRATE IV INFUSION ADD-ON: CPT

## 2025-06-02 PROCEDURE — 84484 ASSAY OF TROPONIN QUANT: CPT

## 2025-06-02 PROCEDURE — 93005 ELECTROCARDIOGRAM TRACING: CPT

## 2025-06-02 PROCEDURE — 71046 X-RAY EXAM CHEST 2 VIEWS: CPT

## 2025-06-02 PROCEDURE — 6360000004 HC RX CONTRAST MEDICATION

## 2025-06-02 PROCEDURE — 80053 COMPREHEN METABOLIC PANEL: CPT

## 2025-06-02 PROCEDURE — 6370000000 HC RX 637 (ALT 250 FOR IP)

## 2025-06-02 PROCEDURE — 85025 COMPLETE CBC W/AUTO DIFF WBC: CPT

## 2025-06-02 PROCEDURE — 71260 CT THORAX DX C+: CPT

## 2025-06-02 PROCEDURE — 81003 URINALYSIS AUTO W/O SCOPE: CPT

## 2025-06-02 RX ORDER — SODIUM CHLORIDE, SODIUM LACTATE, POTASSIUM CHLORIDE, AND CALCIUM CHLORIDE .6; .31; .03; .02 G/100ML; G/100ML; G/100ML; G/100ML
500 INJECTION, SOLUTION INTRAVENOUS ONCE
Status: COMPLETED | OUTPATIENT
Start: 2025-06-02 | End: 2025-06-02

## 2025-06-02 RX ORDER — METHOCARBAMOL 500 MG/1
750 TABLET, FILM COATED ORAL ONCE
Status: COMPLETED | OUTPATIENT
Start: 2025-06-02 | End: 2025-06-02

## 2025-06-02 RX ORDER — METHOCARBAMOL 500 MG/1
500 TABLET, FILM COATED ORAL 4 TIMES DAILY PRN
Qty: 20 TABLET | Refills: 0 | Status: SHIPPED | OUTPATIENT
Start: 2025-06-02 | End: 2025-06-07

## 2025-06-02 RX ORDER — IOPAMIDOL 755 MG/ML
75 INJECTION, SOLUTION INTRAVASCULAR
Status: COMPLETED | OUTPATIENT
Start: 2025-06-02 | End: 2025-06-02

## 2025-06-02 RX ORDER — LIDOCAINE 4 G/G
1 PATCH TOPICAL ONCE
Status: DISCONTINUED | OUTPATIENT
Start: 2025-06-02 | End: 2025-06-02 | Stop reason: HOSPADM

## 2025-06-02 RX ORDER — ACETAMINOPHEN 325 MG/1
650 TABLET ORAL ONCE
Status: COMPLETED | OUTPATIENT
Start: 2025-06-02 | End: 2025-06-02

## 2025-06-02 RX ADMIN — METHOCARBAMOL 750 MG: 500 TABLET ORAL at 11:51

## 2025-06-02 RX ADMIN — SODIUM CHLORIDE, SODIUM LACTATE, POTASSIUM CHLORIDE, AND CALCIUM CHLORIDE 500 ML: .6; .31; .03; .02 INJECTION, SOLUTION INTRAVENOUS at 11:51

## 2025-06-02 RX ADMIN — IOPAMIDOL 75 ML: 755 INJECTION, SOLUTION INTRAVENOUS at 12:11

## 2025-06-02 RX ADMIN — ACETAMINOPHEN 650 MG: 325 TABLET ORAL at 11:51

## 2025-06-02 ASSESSMENT — PAIN - FUNCTIONAL ASSESSMENT: PAIN_FUNCTIONAL_ASSESSMENT: 0-10

## 2025-06-02 ASSESSMENT — PAIN DESCRIPTION - LOCATION: LOCATION: CHEST

## 2025-06-02 ASSESSMENT — PAIN DESCRIPTION - PAIN TYPE: TYPE: ACUTE PAIN

## 2025-06-02 ASSESSMENT — PAIN SCALES - GENERAL: PAINLEVEL_OUTOF10: 4

## 2025-06-02 ASSESSMENT — PAIN DESCRIPTION - ORIENTATION: ORIENTATION: LEFT

## 2025-06-02 NOTE — DISCHARGE INSTRUCTIONS
You were seen in the emergency department for   Chief Complaint   Patient presents with    Chest Pain     Patient arrived to the ED with a chief complaint of chest pain that started last night. Patient reports that she had mild pain that increase. She woke up around 0300 with pain in her shoulder that radiated towards her elbow   .      I recommend that you follow up with your primary care physician in the next week for further evaluation and management.    I have prescribed you the following:  New Prescriptions    No medications on file     Please take these medications as prescribed.    Please trial pain control with Tylenol and Robaxin at home and follow-up with your primary care provider in the next few days.    Please return to the emergency department if you develop worsening symptoms, including fever, headache, abdominal pain, nausea, vomiting, diarrhea, chest pain or chest tightness, feel as if you are going to pass out, or have any other new or concerning symptoms.     Future Appointments   Date Time Provider Department Center   6/4/2025  1:00 PM Lavonne Berry MD PLASTICS/REC [facility]   6/23/2025 10:15 AM Star Grewal MD [facility]

## 2025-06-02 NOTE — ED PROVIDER NOTES
ED Attending Attestation Note    Date of service:  06/02/25    This patient was seen by the resident physician.  I have seen and examined the patient, agree with the workup, evaluation, management and diagnosis. The care plan has been discussed and I concur.  I have reviewed the ECG and concur with the resident's interpretation.    My assessment reveals a 60 y.o. female in ED with complaint of chest pain.  My examination she is resting comfortably she is mildly tachycardic to 105.  Patient has significant tenderness to palpation over the left anterior chest at the 5th and 6th rib without evidence of induration or erythema.  Her lungs are clear to auscultation bilaterally.  Heart is tachycardic but regular rhythm.  Her abdomen is soft and nontender.  EKG without evidence of ischemic findings.  Troponin is negative.  Pulmonary embolus study shows no evidence of acute PE.  Patient's heart score here is 3, low concern for ACS.    Rafy Larios MD  Emergency Medicine  [facility]      Rafy Larios MD  06/04/25 [id number]    
(postoperative nausea and vomiting), Reactive airway disease, and Thrombocytosis.  She has a past surgical history that includes  section; Mandible surgery; Knee arthroscopy; Breast lumpectomy (Left, 2015); Breast biopsy (2011); Breast biopsy (Bilateral, 2015); Breast biopsy (Left, 2015); cervical fusion (2025); and Breast reduction surgery (Bilateral, 2025).  Her family history includes Breast Cancer in her maternal aunt; Breast Cancer (age of onset: 47) in her mother; Cancer in her father; Heart Disease in her maternal grandfather; Lung Cancer in her father; Migraines in her mother.  She reports that she quit smoking about 27 years ago. Her smoking use included cigarettes. She started smoking about 45 years ago. She has a 18 pack-year smoking history. She has never used smokeless tobacco. She reports that she does not currently use alcohol. She reports that she does not use drugs.    Medications     Current Discharge Medication List        CONTINUE these medications which have NOT CHANGED    Details   esomeprazole (NEXIUM) 40 MG delayed release capsule Take 1 capsule by mouth every morning (before breakfast)  Qty: 30 capsule, Refills: 5    Comments: ZERO refills remain on this prescription. Your patient is requesting advance approval of refills for this medication to PREVENT ANY MISSED DOSES      docusate sodium (COLACE) 100 MG capsule Take 1 capsule by mouth 2 times daily  Qty: 30 capsule, Refills: 0      atorvastatin (LIPITOR) 20 MG tablet Take 1 tablet by mouth daily  Qty: 30 tablet, Refills: 5      traZODone (DESYREL) 50 MG tablet Take 2 tablets by mouth nightly  Qty: 60 tablet, Refills: 5      EPINEPHrine (EPIPEN 2-CRUZ) 0.3 MG/0.3ML SOAJ injection Inject 0.3 mLs into the muscle daily as needed (anaphylaxis) Use as directed for allergic reaction  Qty: 2 each, Refills: 5      meclizine (ANTIVERT) 12.5 MG tablet Take 1 tablet by mouth 3 times daily as needed for Dizziness  Qty:

## 2025-06-04 ENCOUNTER — OFFICE VISIT (OUTPATIENT)
Dept: SURGERY | Age: 60
End: 2025-06-04

## 2025-06-04 VITALS
DIASTOLIC BLOOD PRESSURE: 83 MMHG | SYSTOLIC BLOOD PRESSURE: 129 MMHG | WEIGHT: 176 LBS | HEIGHT: 64 IN | BODY MASS INDEX: 30.05 KG/M2 | HEART RATE: 89 BPM

## 2025-06-04 DIAGNOSIS — Z09 POSTOP CHECK: Primary | ICD-10-CM

## 2025-06-04 PROCEDURE — 99024 POSTOP FOLLOW-UP VISIT: CPT | Performed by: SURGERY

## 2025-06-04 RX ORDER — METHOCARBAMOL 750 MG/1
750 TABLET, FILM COATED ORAL 3 TIMES DAILY
Qty: 21 TABLET | Refills: 0 | Status: SHIPPED | OUTPATIENT
Start: 2025-06-04 | End: 2025-06-11

## 2025-06-06 ENCOUNTER — OFFICE VISIT (OUTPATIENT)
Dept: FAMILY MEDICINE CLINIC | Age: 60
End: 2025-06-06
Payer: COMMERCIAL

## 2025-06-06 VITALS
SYSTOLIC BLOOD PRESSURE: 130 MMHG | TEMPERATURE: 114 F | OXYGEN SATURATION: 96 % | DIASTOLIC BLOOD PRESSURE: 76 MMHG | BODY MASS INDEX: 29.5 KG/M2 | WEIGHT: 172.8 LBS | HEIGHT: 64 IN

## 2025-06-06 DIAGNOSIS — D75.839 THROMBOCYTOSIS: ICD-10-CM

## 2025-06-06 DIAGNOSIS — R93.89 ABNORMAL CT OF THE CHEST: ICD-10-CM

## 2025-06-06 DIAGNOSIS — D45 POLYCYTHEMIA VERA (HCC): ICD-10-CM

## 2025-06-06 DIAGNOSIS — K44.9 HIATAL HERNIA: Primary | ICD-10-CM

## 2025-06-06 DIAGNOSIS — R07.89 CHEST WALL PAIN: ICD-10-CM

## 2025-06-06 DIAGNOSIS — N62 MACROMASTIA: ICD-10-CM

## 2025-06-06 PROCEDURE — 99214 OFFICE O/P EST MOD 30 MIN: CPT | Performed by: FAMILY MEDICINE

## 2025-06-06 NOTE — PROGRESS NOTES
Here for f/u.  Pt is about 5 weeks s/p breast reduction.  Pt states that everything overall went well with some mild healing issues on R nipple.  Pt did stop ASA and hydrea until next week.  Pt did end up in ER due to concern of chest pain. Pt is sleeping in recliner. Pt was walking and felt some pain in ACW, that went away.  Pt then woke up with severe pain sx and fluctuations in HR.  Pt felt that with breathing felt some increase in pain.  Pt presented to ER and they did a full workup, including bloodwork, normal BNP and troponin.  Pt had CT scan of chest to r/o PE, which was normal.  Denies any leg pain, calf pain, swelling.    Pt did know that she does have hx of hiatal hernia, that showed some irritation/thickening of distal esophagus.  Pts apptite is down significantly. From neck surgery.  Pt does remain on nexium and does not have any significant GERD sx unless eating late.      Except as noted above in the history of present illness, the review of systems is  negative for headache, vision changes, chest pain, shortness of breath, abdominal pain, urinary sx, bowel changes.    Past medical, surgical, and social history reviewed and updated  Medications and allergies reviewed and updated      O: /76   Temp (!) 114 °F (45.6 °C)   Ht 1.626 m (5' 4.02\")   Wt 78.4 kg (172 lb 12.8 oz)   LMP 03/13/2019 (Approximate)   SpO2 96%   BMI 29.65 kg/m²   GEN: No acute distress, cooperative, well nourished, alert.   HEENT: PEERLA, EOMI , normocephalic/atraumatic, nares and oropharynx clear.  Mucous membranes normal, Tympanic membranes clear bilaterally.  Neck: soft, supple, no thyromegaly, mass, no Lymphadenopathy  CV: Regular rate and rhythm, no murmur, rubs, gallops. No edema.  Resp: Clear to auscultation bilaterally good air entry bilaterally  No crackles, wheeze. Breathing comfortably.   Psych: mood stable, No suicidal thoughts or ideation         Current Outpatient Medications   Medication Sig Dispense Refill

## 2025-06-13 ENCOUNTER — PATIENT MESSAGE (OUTPATIENT)
Dept: FAMILY MEDICINE CLINIC | Age: 60
End: 2025-06-13

## 2025-06-14 RX ORDER — METHOCARBAMOL 500 MG/1
500 TABLET, FILM COATED ORAL 4 TIMES DAILY PRN
Qty: 20 TABLET | Refills: 0 | Status: SHIPPED | OUTPATIENT
Start: 2025-06-14 | End: 2025-06-19

## 2025-06-18 ENCOUNTER — TELEPHONE (OUTPATIENT)
Age: 60
End: 2025-06-18

## 2025-06-18 NOTE — TELEPHONE ENCOUNTER
Pt called asking if she could come in at 8:00AM on 06/23/2025 instead of her appt time at 10:15. She states she will not be able to make on time at 10:15.     Call back number- [phone]

## 2025-06-23 ENCOUNTER — TELEPHONE (OUTPATIENT)
Dept: FAMILY MEDICINE CLINIC | Age: 60
End: 2025-06-23

## 2025-06-23 ENCOUNTER — PATIENT MESSAGE (OUTPATIENT)
Dept: FAMILY MEDICINE CLINIC | Age: 60
End: 2025-06-23

## 2025-06-23 DIAGNOSIS — M50.30 DDD (DEGENERATIVE DISC DISEASE), CERVICAL: Primary | ICD-10-CM

## 2025-06-23 DIAGNOSIS — M54.12 CERVICAL RADICULOPATHY: ICD-10-CM

## 2025-06-25 ENCOUNTER — HOSPITAL ENCOUNTER (OUTPATIENT)
Dept: MRI IMAGING | Age: 60
Discharge: HOME | End: 2025-06-25
Payer: COMMERCIAL

## 2025-06-25 ENCOUNTER — OFFICE VISIT (OUTPATIENT)
Age: 60
End: 2025-06-25
Payer: COMMERCIAL

## 2025-06-25 DIAGNOSIS — L57.0 ACTINIC KERATOSIS: ICD-10-CM

## 2025-06-25 DIAGNOSIS — M54.12 CERVICAL RADICULOPATHY: ICD-10-CM

## 2025-06-25 DIAGNOSIS — L30.4 INTERTRIGO: ICD-10-CM

## 2025-06-25 DIAGNOSIS — D22.9 MULTIPLE NEVI: Primary | ICD-10-CM

## 2025-06-25 DIAGNOSIS — M50.30 DDD (DEGENERATIVE DISC DISEASE), CERVICAL: ICD-10-CM

## 2025-06-25 DIAGNOSIS — Z85.828 HISTORY OF BASAL CELL CARCINOMA: ICD-10-CM

## 2025-06-25 DIAGNOSIS — L82.1 SK (SEBORRHEIC KERATOSIS): ICD-10-CM

## 2025-06-25 PROCEDURE — 72141 MRI NECK SPINE W/O DYE: CPT

## 2025-06-25 PROCEDURE — 99213 OFFICE O/P EST LOW 20 MIN: CPT | Performed by: DERMATOLOGY

## 2025-06-25 PROCEDURE — 17000 DESTRUCT PREMALG LESION: CPT | Performed by: DERMATOLOGY

## 2025-06-25 NOTE — PROGRESS NOTES
[facility]  Star Grewal MD  [phone]      Ambar Martinez  1965    60 y.o. female     Date of Visit: 6/25/2025    Chief Complaint: skin lesions of concern    History of Present Illness:    1.  She presents today for evaluation of multiple moles - not aware of any changes in size, color or shape.       2.  She reports a growth on the left side of the scalp.    3.  She also reports a persistent scaly lesion on the right medial cheek.    4.  She has a history of intertrigo that comes and goes.  It has been quiescent since having a breast reduction recently.    5.  She has a history of multiple basal cell carcinomas-denies any signs of recurrence.    Small nodular and superficial BCC of the right lower back-treated with curettage on 11/28/2023.  Small nodular and superficial BCC of the right anterior thigh-treated with curettage on 5/24/2023.  Nodular BCC of the right upper forehead-treated with Mohs by Dr. Stack on 9/27/2022.  Superficial BCC of the right clavicular region-treated with curettage on 9/24/2021.  Nodular BCC on the central forehead-treated with Mohs by Dr. Stack on 2/14/2019.  Superficial basal cell carcinoma on the right clavicular region-treated with curettage on 2/1/2019.  Nodular BCC on the right side of the forehead-treated with Mohs by Dr. Stack on 8/16/2018.  Nodular BCC on the left side of the forehead-treated with Mohs by Dr. Stack on 8/9/2018.     Has several grandchildren.       Review of Systems:  Gen: Feels well, good sense of health.    Past Medical History, Family History, Surgical History, Medications and Allergies reviewed.    Past Medical History:   Diagnosis Date    Anxiety     Arthritis     DDD (degenerative disc disease), cervical     Depression     Essential thrombocythemia (HCC)     Gastroesophageal reflux disease without esophagitis 05/30/2017    Gigantomastia     Headache     Hiatal hernia 06/06/2025    History of renal insufficiency

## 2025-06-27 ENCOUNTER — RESULTS FOLLOW-UP (OUTPATIENT)
Dept: FAMILY MEDICINE CLINIC | Age: 60
End: 2025-06-27

## 2025-07-08 NOTE — PROGRESS NOTES
[facility]    PROCEDURE:  1. Bilateral breast amputations                            2. Bilateral free nipple grafting.     3. Application of Mary incisional wound vacuum devices  DATE: 5/1/25    Ambar Martinez has been recovering well since her procedure. Pain has been well controlled without pain medications.     EXAM    /73   Pulse 87   Temp 97.5 °F (36.4 °C)   Wt 81.2 kg (179 lb)   LMP 03/13/2019 (Approximate)   SpO2 98%   BMI 30.71 kg/m²      GEN: NAD   BREAST: Incisions healing well  Good contour  Nipple grafts viable    IMP: 60 y.o.female s/p breast amputation with free nipple grafting  PLAN: Doing well overall. She is thus far happy with her results as her symptoms of macromastia have resolved. Will follow-up in 6 months.    Geovany Berry MD  [place] [facility]  [phone]  07/09/25

## 2025-07-09 ENCOUNTER — OFFICE VISIT (OUTPATIENT)
Dept: SURGERY | Age: 60
End: 2025-07-09

## 2025-07-09 VITALS
BODY MASS INDEX: 30.71 KG/M2 | OXYGEN SATURATION: 98 % | HEART RATE: 87 BPM | TEMPERATURE: 97.5 F | WEIGHT: 179 LBS | DIASTOLIC BLOOD PRESSURE: 73 MMHG | SYSTOLIC BLOOD PRESSURE: 117 MMHG

## 2025-07-09 DIAGNOSIS — Z09 POSTOP CHECK: Primary | ICD-10-CM

## 2025-07-09 PROCEDURE — 99024 POSTOP FOLLOW-UP VISIT: CPT | Performed by: SURGERY

## 2025-09-04 RX ORDER — TRAZODONE HYDROCHLORIDE 50 MG/1
100 TABLET ORAL NIGHTLY PRN
Qty: 60 TABLET | Refills: 2 | Status: SHIPPED | OUTPATIENT
Start: 2025-09-04

## (undated) DEVICE — TOWEL,STOP FLAG GOLD N-W: Brand: MEDLINE

## (undated) DEVICE — GAUZE FLUFF 1 PLY: Brand: DEROYAL

## (undated) DEVICE — TRAP FLUID

## (undated) DEVICE — GLOVE ORANGE PI 8 1/2   MSG9085

## (undated) DEVICE — Device

## (undated) DEVICE — TUBING, SUCTION, 1/4" X 12', STRAIGHT: Brand: MEDLINE

## (undated) DEVICE — BLANKET WRM W40.2XL55.9IN IORT LO BODY + MISTRAL AIR

## (undated) DEVICE — SUTURE VICRYL + SZ 0 L18IN ABSRB UD L36MM CT-1 1/2 CIR VCP840D

## (undated) DEVICE — INTENDED USE FOR SURGICAL MARKING ON INTACT SKIN, ALSO PROVIDES A PERMANENT METHOD OF IDENTIFYING OBJECTS IN THE OPERATING ROOM: Brand: WRITESITE® PLUS MINI PREP RESISTANT MARKER

## (undated) DEVICE — SYSTEM WND THER 14 DAY 150 CC CANSTR THER UNIT PREVENA + 125

## (undated) DEVICE — SEALANT TISS 4 CC FIBRIN VISTASEAL

## (undated) DEVICE — DRAIN SURG 15FR L3 16IN DIA47MM SIL RND HUBLESS FULL FLUT

## (undated) DEVICE — GLOVE ORANGE PI 7 1/2   MSG9075

## (undated) DEVICE — PAD FOAM 11.75X7-7/8 IN RESTON LF

## (undated) DEVICE — STAPLER SKIN H3.9MM WIRE DIA0.58MM CRWN 6.9MM 35 STPL ROT

## (undated) DEVICE — 3M™ TEGADERM™ CHG CHLORHEXIDINE GLUCONATE GEL PAD 1664, 25 EACH/CARTON, 4 CARTONS/CASE: Brand: 3M™ TEGADERM™

## (undated) DEVICE — 1010 S-DRAPE TOWEL DRAPE 10/BX: Brand: STERI-DRAPE™

## (undated) DEVICE — TOWEL,OR,DSP,ST,BLUE,DLX,8/PK,10PK/CS: Brand: MEDLINE

## (undated) DEVICE — PLASTIC MAJOR: Brand: MEDLINE INDUSTRIES, INC.

## (undated) DEVICE — NEGATIVE PRESSURE THERAPY KIT 24X22 CM PREVENA RESTOR FRM

## (undated) DEVICE — SYSTEM SKIN CLOSURE 42CM DERMABOND PRINEO

## (undated) DEVICE — SUTURE MONOCRYL SZ 3-0 L27IN ABSRB UD PS-2 3/8 CIR REV CUT NDL MCP427H

## (undated) DEVICE — 3M™ STERI-STRIP™ BLEND TONE SKIN CLOSURES, B1557, TAN, 1/2 IN X 4 IN (12MM X 100MM), 6 STRIPS/ENVELOPE: Brand: 3M™ STERI-STRIP™

## (undated) DEVICE — GARMENT,MEDLINE,DVT,INT,CALF,MED, GEN2: Brand: MEDLINE

## (undated) DEVICE — PLASMABLADE PS210-030S 3.0S LOCK: Brand: PLASMABLADE™

## (undated) DEVICE — BRA SURG SUPP LG 38-40 IN ZIPPER

## (undated) DEVICE — SYRINGE IRRIG 60ML SFT PLIABLE BLB EZ TO GRP 1 HND USE W/

## (undated) DEVICE — AGENT HEMSTAT 3GM OXIDIZED REGENERATED CELOS ABSRB FOR CONT (ORDER MULTIPLES OF 5EA)

## (undated) DEVICE — SUTURE MONOCRYL STRATAFIX SPRL + SZ 3-0 L18IN ABSRB UD PS-2 SXMP1B107